# Patient Record
Sex: FEMALE | Race: WHITE | NOT HISPANIC OR LATINO | Employment: OTHER | RURAL
[De-identification: names, ages, dates, MRNs, and addresses within clinical notes are randomized per-mention and may not be internally consistent; named-entity substitution may affect disease eponyms.]

---

## 2021-06-24 DIAGNOSIS — I10 HYPERTENSION, UNSPECIFIED TYPE: Primary | ICD-10-CM

## 2021-07-01 ENCOUNTER — LAB VISIT (OUTPATIENT)
Dept: LAB | Facility: CLINIC | Age: 71
End: 2021-07-01
Payer: MEDICARE

## 2021-07-01 ENCOUNTER — OFFICE VISIT (OUTPATIENT)
Dept: PRIMARY CARE CLINIC | Facility: CLINIC | Age: 71
End: 2021-07-01
Payer: MEDICARE

## 2021-07-01 ENCOUNTER — TELEPHONE (OUTPATIENT)
Dept: PRIMARY CARE CLINIC | Facility: CLINIC | Age: 71
End: 2021-07-01

## 2021-07-01 VITALS
SYSTOLIC BLOOD PRESSURE: 124 MMHG | BODY MASS INDEX: 25.07 KG/M2 | WEIGHT: 156 LBS | HEART RATE: 66 BPM | DIASTOLIC BLOOD PRESSURE: 68 MMHG | OXYGEN SATURATION: 97 % | HEIGHT: 66 IN | RESPIRATION RATE: 18 BRPM

## 2021-07-01 DIAGNOSIS — I10 HYPERTENSION, UNSPECIFIED TYPE: ICD-10-CM

## 2021-07-01 DIAGNOSIS — F41.9 ANXIETY: Primary | ICD-10-CM

## 2021-07-01 DIAGNOSIS — L50.9 URTICARIA: ICD-10-CM

## 2021-07-01 DIAGNOSIS — J30.9 ALLERGIC RHINITIS, UNSPECIFIED SEASONALITY, UNSPECIFIED TRIGGER: ICD-10-CM

## 2021-07-01 LAB
ALBUMIN SERPL BCP-MCNC: 3.7 G/DL (ref 3.5–5)
ALBUMIN/GLOB SERPL: 1.1 {RATIO}
ALP SERPL-CCNC: 71 U/L (ref 55–142)
ALT SERPL W P-5'-P-CCNC: 17 U/L (ref 13–56)
ANION GAP SERPL CALCULATED.3IONS-SCNC: 8 MMOL/L (ref 7–16)
AST SERPL W P-5'-P-CCNC: 14 U/L (ref 15–37)
BASOPHILS # BLD AUTO: 0.15 K/UL (ref 0–0.2)
BASOPHILS NFR BLD AUTO: 1.8 % (ref 0–1)
BILIRUB SERPL-MCNC: 0.2 MG/DL (ref 0–1.2)
BUN SERPL-MCNC: 13 MG/DL (ref 7–18)
BUN/CREAT SERPL: 15 (ref 6–20)
CALCIUM SERPL-MCNC: 8.3 MG/DL (ref 8.5–10.1)
CHLORIDE SERPL-SCNC: 108 MMOL/L (ref 98–107)
CHOLEST SERPL-MCNC: 226 MG/DL (ref 0–200)
CHOLEST/HDLC SERPL: 6.1 {RATIO}
CO2 SERPL-SCNC: 27 MMOL/L (ref 21–32)
CREAT SERPL-MCNC: 0.89 MG/DL (ref 0.55–1.02)
DIFFERENTIAL METHOD BLD: ABNORMAL
EOSINOPHIL # BLD AUTO: 0.62 K/UL (ref 0–0.5)
EOSINOPHIL NFR BLD AUTO: 7.5 % (ref 1–4)
ERYTHROCYTE [DISTWIDTH] IN BLOOD BY AUTOMATED COUNT: 13 % (ref 11.5–14.5)
EST. AVERAGE GLUCOSE BLD GHB EST-MCNC: 87 MG/DL
GLOBULIN SER-MCNC: 3.4 G/DL (ref 2–4)
GLUCOSE SERPL-MCNC: 105 MG/DL (ref 74–106)
HBA1C MFR BLD HPLC: 5.2 % (ref 4.5–6.6)
HCT VFR BLD AUTO: 41.7 % (ref 38–47)
HDLC SERPL-MCNC: 37 MG/DL (ref 40–60)
HGB BLD-MCNC: 14 G/DL (ref 12–16)
IMM GRANULOCYTES # BLD AUTO: 0.02 K/UL (ref 0–0.04)
IMM GRANULOCYTES NFR BLD: 0.2 % (ref 0–0.4)
LDLC SERPL CALC-MCNC: 141 MG/DL
LDLC/HDLC SERPL: 3.8 {RATIO}
LYMPHOCYTES # BLD AUTO: 3.41 K/UL (ref 1–4.8)
LYMPHOCYTES NFR BLD AUTO: 41.5 % (ref 27–41)
MCH RBC QN AUTO: 30.7 PG (ref 27–31)
MCHC RBC AUTO-ENTMCNC: 33.6 G/DL (ref 32–36)
MCV RBC AUTO: 91.4 FL (ref 80–96)
MONOCYTES # BLD AUTO: 0.52 K/UL (ref 0–0.8)
MONOCYTES NFR BLD AUTO: 6.3 % (ref 2–6)
MPC BLD CALC-MCNC: 12 FL (ref 9.4–12.4)
NEUTROPHILS # BLD AUTO: 3.5 K/UL (ref 1.8–7.7)
NEUTROPHILS NFR BLD AUTO: 42.7 % (ref 53–65)
NONHDLC SERPL-MCNC: 189 MG/DL
NRBC # BLD AUTO: 0 X10E3/UL
NRBC, AUTO (.00): 0 %
PLATELET # BLD AUTO: 264 K/UL (ref 150–400)
POTASSIUM SERPL-SCNC: 3.9 MMOL/L (ref 3.5–5.1)
PROT SERPL-MCNC: 7.1 G/DL (ref 6.4–8.2)
RBC # BLD AUTO: 4.56 M/UL (ref 4.2–5.4)
SODIUM SERPL-SCNC: 139 MMOL/L (ref 136–145)
TRIGL SERPL-MCNC: 238 MG/DL (ref 35–150)
TSH SERPL DL<=0.005 MIU/L-ACNC: 5.46 UIU/ML (ref 0.36–3.74)
VLDLC SERPL-MCNC: 48 MG/DL
WBC # BLD AUTO: 8.22 K/UL (ref 4.5–11)

## 2021-07-01 PROCEDURE — 99214 OFFICE O/P EST MOD 30 MIN: CPT | Mod: ,,, | Performed by: FAMILY MEDICINE

## 2021-07-01 PROCEDURE — 36415 PR COLLECTION VENOUS BLOOD,VENIPUNCTURE: ICD-10-PCS | Mod: ,,, | Performed by: CLINICAL MEDICAL LABORATORY

## 2021-07-01 PROCEDURE — 83036 HEMOGLOBIN GLYCOSYLATED A1C: CPT | Mod: GZ,,, | Performed by: CLINICAL MEDICAL LABORATORY

## 2021-07-01 PROCEDURE — 36415 COLL VENOUS BLD VENIPUNCTURE: CPT | Mod: ,,, | Performed by: CLINICAL MEDICAL LABORATORY

## 2021-07-01 PROCEDURE — 80053 COMPREHENSIVE METABOLIC PANEL: ICD-10-PCS | Mod: ,,, | Performed by: CLINICAL MEDICAL LABORATORY

## 2021-07-01 PROCEDURE — 80061 LIPID PANEL: ICD-10-PCS | Mod: ,,, | Performed by: CLINICAL MEDICAL LABORATORY

## 2021-07-01 PROCEDURE — 80061 LIPID PANEL: CPT | Mod: ,,, | Performed by: CLINICAL MEDICAL LABORATORY

## 2021-07-01 PROCEDURE — 85025 CBC WITH DIFFERENTIAL: ICD-10-PCS | Mod: ,,, | Performed by: CLINICAL MEDICAL LABORATORY

## 2021-07-01 PROCEDURE — 83036 HEMOGLOBIN A1C: ICD-10-PCS | Mod: GZ,,, | Performed by: CLINICAL MEDICAL LABORATORY

## 2021-07-01 PROCEDURE — 85025 COMPLETE CBC W/AUTO DIFF WBC: CPT | Mod: ,,, | Performed by: CLINICAL MEDICAL LABORATORY

## 2021-07-01 PROCEDURE — 80053 COMPREHEN METABOLIC PANEL: CPT | Mod: ,,, | Performed by: CLINICAL MEDICAL LABORATORY

## 2021-07-01 PROCEDURE — 84443 TSH: ICD-10-PCS | Mod: ,,, | Performed by: CLINICAL MEDICAL LABORATORY

## 2021-07-01 PROCEDURE — 99214 PR OFFICE/OUTPT VISIT, EST, LEVL IV, 30-39 MIN: ICD-10-PCS | Mod: ,,, | Performed by: FAMILY MEDICINE

## 2021-07-01 PROCEDURE — 84443 ASSAY THYROID STIM HORMONE: CPT | Mod: ,,, | Performed by: CLINICAL MEDICAL LABORATORY

## 2021-07-01 RX ORDER — CHLORDIAZEPOXIDE HYDROCHLORIDE 5 MG/1
5 CAPSULE, GELATIN COATED ORAL 3 TIMES DAILY PRN
Qty: 90 CAPSULE | Refills: 5 | Status: SHIPPED | OUTPATIENT
Start: 2021-07-01 | End: 2022-06-21 | Stop reason: SDUPTHER

## 2021-07-01 RX ORDER — PRAVASTATIN SODIUM 10 MG/1
10 TABLET ORAL DAILY
Qty: 90 TABLET | Refills: 3 | Status: SHIPPED | OUTPATIENT
Start: 2021-07-01 | End: 2022-06-21 | Stop reason: SDUPTHER

## 2021-07-01 RX ORDER — MONTELUKAST SODIUM 10 MG/1
10 TABLET ORAL NIGHTLY
Qty: 90 TABLET | Refills: 3 | Status: SHIPPED | OUTPATIENT
Start: 2021-07-01 | End: 2022-09-06

## 2021-07-01 RX ORDER — LEVOTHYROXINE SODIUM 50 UG/1
50 TABLET ORAL
Qty: 90 TABLET | Refills: 3 | Status: SHIPPED | OUTPATIENT
Start: 2021-07-01 | End: 2022-06-21 | Stop reason: SDUPTHER

## 2021-07-01 RX ORDER — ASPIRIN 325 MG
325 TABLET ORAL DAILY
COMMUNITY

## 2021-09-14 DIAGNOSIS — I10 HYPERTENSION, UNSPECIFIED TYPE: Primary | ICD-10-CM

## 2021-09-28 ENCOUNTER — LAB VISIT (OUTPATIENT)
Dept: LAB | Facility: CLINIC | Age: 71
End: 2021-09-28
Payer: MEDICARE

## 2021-09-28 DIAGNOSIS — R73.9 HYPERGLYCEMIA: Primary | ICD-10-CM

## 2021-09-28 DIAGNOSIS — I10 HYPERTENSION, UNSPECIFIED TYPE: ICD-10-CM

## 2021-09-28 LAB
ALBUMIN SERPL BCP-MCNC: 3.8 G/DL (ref 3.5–5)
ALBUMIN/GLOB SERPL: 1 {RATIO}
ALP SERPL-CCNC: 80 U/L (ref 55–142)
ALT SERPL W P-5'-P-CCNC: 15 U/L (ref 13–56)
ANION GAP SERPL CALCULATED.3IONS-SCNC: 8 MMOL/L (ref 7–16)
AST SERPL W P-5'-P-CCNC: 17 U/L (ref 15–37)
BASOPHILS # BLD AUTO: 0.11 K/UL (ref 0–0.2)
BASOPHILS NFR BLD AUTO: 1.3 % (ref 0–1)
BILIRUB SERPL-MCNC: 0.3 MG/DL (ref 0–1.2)
BUN SERPL-MCNC: 13 MG/DL (ref 7–18)
BUN/CREAT SERPL: 14 (ref 6–20)
CALCIUM SERPL-MCNC: 9.7 MG/DL (ref 8.5–10.1)
CHLORIDE SERPL-SCNC: 111 MMOL/L (ref 98–107)
CHOLEST SERPL-MCNC: 193 MG/DL (ref 0–200)
CHOLEST/HDLC SERPL: 4.4 {RATIO}
CO2 SERPL-SCNC: 26 MMOL/L (ref 21–32)
CREAT SERPL-MCNC: 0.9 MG/DL (ref 0.55–1.02)
DIFFERENTIAL METHOD BLD: ABNORMAL
EOSINOPHIL # BLD AUTO: 0.62 K/UL (ref 0–0.5)
EOSINOPHIL NFR BLD AUTO: 7.6 % (ref 1–4)
ERYTHROCYTE [DISTWIDTH] IN BLOOD BY AUTOMATED COUNT: 12.8 % (ref 11.5–14.5)
EST. AVERAGE GLUCOSE BLD GHB EST-MCNC: 90 MG/DL
GLOBULIN SER-MCNC: 3.8 G/DL (ref 2–4)
GLUCOSE SERPL-MCNC: 144 MG/DL (ref 74–106)
HBA1C MFR BLD HPLC: 5.3 % (ref 4.5–6.6)
HCT VFR BLD AUTO: 43.4 % (ref 38–47)
HDLC SERPL-MCNC: 44 MG/DL (ref 40–60)
HGB BLD-MCNC: 14.4 G/DL (ref 12–16)
IMM GRANULOCYTES # BLD AUTO: 0.02 K/UL (ref 0–0.04)
IMM GRANULOCYTES NFR BLD: 0.2 % (ref 0–0.4)
LDLC SERPL CALC-MCNC: 116 MG/DL
LDLC/HDLC SERPL: 2.6 {RATIO}
LYMPHOCYTES # BLD AUTO: 2.3 K/UL (ref 1–4.8)
LYMPHOCYTES NFR BLD AUTO: 28.1 % (ref 27–41)
MCH RBC QN AUTO: 30.9 PG (ref 27–31)
MCHC RBC AUTO-ENTMCNC: 33.2 G/DL (ref 32–36)
MCV RBC AUTO: 93.1 FL (ref 80–96)
MONOCYTES # BLD AUTO: 0.45 K/UL (ref 0–0.8)
MONOCYTES NFR BLD AUTO: 5.5 % (ref 2–6)
MPC BLD CALC-MCNC: 12.5 FL (ref 9.4–12.4)
NEUTROPHILS # BLD AUTO: 4.68 K/UL (ref 1.8–7.7)
NEUTROPHILS NFR BLD AUTO: 57.3 % (ref 53–65)
NONHDLC SERPL-MCNC: 149 MG/DL
NRBC # BLD AUTO: 0 X10E3/UL
NRBC, AUTO (.00): 0 %
PLATELET # BLD AUTO: 260 K/UL (ref 150–400)
POTASSIUM SERPL-SCNC: 3.8 MMOL/L (ref 3.5–5.1)
PROT SERPL-MCNC: 7.6 G/DL (ref 6.4–8.2)
RBC # BLD AUTO: 4.66 M/UL (ref 4.2–5.4)
SODIUM SERPL-SCNC: 141 MMOL/L (ref 136–145)
TRIGL SERPL-MCNC: 166 MG/DL (ref 35–150)
TSH SERPL DL<=0.005 MIU/L-ACNC: 1.81 UIU/ML (ref 0.36–3.74)
VLDLC SERPL-MCNC: 33 MG/DL
WBC # BLD AUTO: 8.18 K/UL (ref 4.5–11)

## 2021-09-28 PROCEDURE — 80053 COMPREHENSIVE METABOLIC PANEL: ICD-10-PCS | Mod: ,,, | Performed by: CLINICAL MEDICAL LABORATORY

## 2021-09-28 PROCEDURE — 84443 TSH: ICD-10-PCS | Mod: ,,, | Performed by: CLINICAL MEDICAL LABORATORY

## 2021-09-28 PROCEDURE — 84443 ASSAY THYROID STIM HORMONE: CPT | Mod: ,,, | Performed by: CLINICAL MEDICAL LABORATORY

## 2021-09-28 PROCEDURE — 80053 COMPREHEN METABOLIC PANEL: CPT | Mod: ,,, | Performed by: CLINICAL MEDICAL LABORATORY

## 2021-09-28 PROCEDURE — 85025 CBC WITH DIFFERENTIAL: ICD-10-PCS | Mod: ,,, | Performed by: CLINICAL MEDICAL LABORATORY

## 2021-09-28 PROCEDURE — 85025 COMPLETE CBC W/AUTO DIFF WBC: CPT | Mod: ,,, | Performed by: CLINICAL MEDICAL LABORATORY

## 2021-09-28 PROCEDURE — 80061 LIPID PANEL: CPT | Mod: ,,, | Performed by: CLINICAL MEDICAL LABORATORY

## 2021-09-28 PROCEDURE — 80061 LIPID PANEL: ICD-10-PCS | Mod: ,,, | Performed by: CLINICAL MEDICAL LABORATORY

## 2021-09-28 PROCEDURE — 36415 COLL VENOUS BLD VENIPUNCTURE: CPT | Mod: ,,, | Performed by: CLINICAL MEDICAL LABORATORY

## 2021-09-28 PROCEDURE — 36415 PR COLLECTION VENOUS BLOOD,VENIPUNCTURE: ICD-10-PCS | Mod: ,,, | Performed by: CLINICAL MEDICAL LABORATORY

## 2021-09-28 PROCEDURE — 83036 HEMOGLOBIN GLYCOSYLATED A1C: CPT | Mod: ,,, | Performed by: CLINICAL MEDICAL LABORATORY

## 2021-09-28 PROCEDURE — 83036 HEMOGLOBIN A1C: ICD-10-PCS | Mod: ,,, | Performed by: CLINICAL MEDICAL LABORATORY

## 2022-04-19 ENCOUNTER — PATIENT MESSAGE (OUTPATIENT)
Dept: PRIMARY CARE CLINIC | Facility: CLINIC | Age: 72
End: 2022-04-19
Payer: COMMERCIAL

## 2022-04-20 RX ORDER — AMOXICILLIN 500 MG/1
500 TABLET, FILM COATED ORAL EVERY 12 HOURS
Qty: 20 TABLET | Refills: 0 | Status: SHIPPED | OUTPATIENT
Start: 2022-04-20 | End: 2022-04-30

## 2022-06-21 ENCOUNTER — OFFICE VISIT (OUTPATIENT)
Dept: PRIMARY CARE CLINIC | Facility: CLINIC | Age: 72
End: 2022-06-21
Payer: MEDICARE

## 2022-06-21 VITALS
WEIGHT: 151 LBS | DIASTOLIC BLOOD PRESSURE: 72 MMHG | OXYGEN SATURATION: 97 % | HEART RATE: 75 BPM | RESPIRATION RATE: 18 BRPM | BODY MASS INDEX: 24.27 KG/M2 | HEIGHT: 66 IN | SYSTOLIC BLOOD PRESSURE: 130 MMHG

## 2022-06-21 DIAGNOSIS — L50.9 URTICARIA: ICD-10-CM

## 2022-06-21 DIAGNOSIS — E03.9 HYPOTHYROIDISM, UNSPECIFIED TYPE: ICD-10-CM

## 2022-06-21 DIAGNOSIS — F41.9 ANXIETY: Primary | ICD-10-CM

## 2022-06-21 DIAGNOSIS — R73.9 HYPERGLYCEMIA: Primary | ICD-10-CM

## 2022-06-21 DIAGNOSIS — J30.9 ALLERGIC RHINITIS, UNSPECIFIED SEASONALITY, UNSPECIFIED TRIGGER: ICD-10-CM

## 2022-06-21 DIAGNOSIS — E78.5 HYPERLIPIDEMIA, UNSPECIFIED HYPERLIPIDEMIA TYPE: ICD-10-CM

## 2022-06-21 PROCEDURE — 99214 PR OFFICE/OUTPT VISIT, EST, LEVL IV, 30-39 MIN: ICD-10-PCS | Mod: ,,, | Performed by: FAMILY MEDICINE

## 2022-06-21 PROCEDURE — 99214 OFFICE O/P EST MOD 30 MIN: CPT | Mod: ,,, | Performed by: FAMILY MEDICINE

## 2022-06-21 RX ORDER — PRAVASTATIN SODIUM 10 MG/1
10 TABLET ORAL DAILY
Qty: 90 TABLET | Refills: 3 | Status: SHIPPED | OUTPATIENT
Start: 2022-06-21 | End: 2023-06-21 | Stop reason: SDUPTHER

## 2022-06-21 RX ORDER — LEVOTHYROXINE SODIUM 50 UG/1
50 TABLET ORAL
Qty: 90 TABLET | Refills: 3 | Status: SHIPPED | OUTPATIENT
Start: 2022-06-21 | End: 2023-06-21 | Stop reason: SDUPTHER

## 2022-06-21 RX ORDER — CHLORDIAZEPOXIDE HYDROCHLORIDE 5 MG/1
5 CAPSULE, GELATIN COATED ORAL 3 TIMES DAILY PRN
Qty: 90 CAPSULE | Refills: 5 | Status: SHIPPED | OUTPATIENT
Start: 2022-06-21 | End: 2023-12-21

## 2022-06-21 RX ORDER — MONTELUKAST SODIUM 10 MG/1
1 TABLET ORAL DAILY
COMMUNITY
Start: 2022-06-10 | End: 2022-06-21

## 2022-06-21 NOTE — PROGRESS NOTES
Subjective:      Patient ID: Krupa Jamse is a 72 y.o. female.    Chief Complaint: Follow-up (Needs refills)    Krupa James a 72 y.o. female presents for follow up on all regular problems which are reviewed and discussed.   Fu reg probs stable needs lab  Problem List Items Addressed This Visit        Psychiatric    Anxiety - Primary       ENT    Allergic rhinitis       Derm    Urticaria          Past Medical History:  Past Medical History:   Diagnosis Date    Anxiety     Arthritis     Stroke      Past Surgical History:   Procedure Laterality Date    CHOLECYSTECTOMY       Review of patient's allergies indicates:   Allergen Reactions    Iodine and iodide containing products     Latex, natural rubber      Current Outpatient Medications on File Prior to Visit   Medication Sig Dispense Refill    aspirin 325 MG tablet Take 325 mg by mouth once daily.      [DISCONTINUED] chlordiazepoxide (LIBRIUM) 5 MG capsule Take 1 capsule (5 mg total) by mouth 3 (three) times daily as needed for Anxiety. 90 capsule 5    [DISCONTINUED] levothyroxine (SYNTHROID) 50 MCG tablet Take 1 tablet (50 mcg total) by mouth before breakfast. 90 tablet 3    [DISCONTINUED] pravastatin (PRAVACHOL) 10 MG tablet Take 1 tablet (10 mg total) by mouth once daily. 90 tablet 3    [DISCONTINUED] montelukast (SINGULAIR) 10 mg tablet Take 1 tablet by mouth Daily.       No current facility-administered medications on file prior to visit.     Social History     Socioeconomic History    Marital status: Legally    Tobacco Use    Smoking status: Current Every Day Smoker     Types: Cigarettes    Smokeless tobacco: Never Used   Substance and Sexual Activity    Alcohol use: Never    Drug use: Never     Family History   Problem Relation Age of Onset    Alzheimer's disease Mother     Diabetes Mother     Stroke Mother     Meniere's disease Mother     Leukemia Father     Parkinsonism Brother        Review of Systems   Constitutional:  "Negative.  Negative for activity change, appetite change, chills and diaphoresis.   HENT: Negative.  Negative for congestion, ear pain, hearing loss and postnasal drip.    Eyes: Negative for itching.   Respiratory: Negative for chest tightness and shortness of breath.    Cardiovascular: Negative for chest pain.   Gastrointestinal: Negative for abdominal pain.   Endocrine: Negative for polydipsia.   Genitourinary: Negative for frequency.   Musculoskeletal: Negative for back pain.   Neurological: Negative for headaches.       Objective:     /72 (BP Location: Right arm, Patient Position: Sitting, BP Method: Large (Manual))   Pulse 75   Resp 18   Ht 5' 6" (1.676 m)   Wt 68.5 kg (151 lb)   SpO2 97%   BMI 24.37 kg/m²     Physical Exam  Constitutional:       Appearance: Normal appearance. She is obese.   HENT:      Head: Normocephalic and atraumatic.      Right Ear: External ear normal.      Left Ear: External ear normal.      Nose: Nose normal.      Mouth/Throat:      Mouth: Mucous membranes are moist.      Pharynx: Oropharynx is clear.   Eyes:      Pupils: Pupils are equal, round, and reactive to light.   Cardiovascular:      Rate and Rhythm: Normal rate and regular rhythm.      Heart sounds: Normal heart sounds.   Pulmonary:      Effort: Pulmonary effort is normal.      Breath sounds: Normal breath sounds.   Abdominal:      Palpations: Abdomen is soft.   Musculoskeletal:      Cervical back: Normal range of motion and neck supple.   Skin:     General: Skin is warm and dry.   Neurological:      General: No focal deficit present.      Mental Status: She is alert and oriented to person, place, and time. Mental status is at baseline.   Psychiatric:         Mood and Affect: Mood normal.         Behavior: Behavior normal.         Thought Content: Thought content normal.         Judgment: Judgment normal.       Assessment:     1. Anxiety    2. Allergic rhinitis, unspecified seasonality, unspecified trigger    3. " Urticaria        Plan:     Problem List Items Addressed This Visit        Psychiatric    Anxiety - Primary       ENT    Allergic rhinitis       Derm    Urticaria        No follow-ups on file.  Ok to have alex leave off last librium if does so  Lab today  6m fu    I am having Krupa James maintain her aspirin, levothyroxine, pravastatin, and chlordiazepoxide.    Krupa was seen today for follow-up.    Diagnoses and all orders for this visit:    Anxiety    Allergic rhinitis, unspecified seasonality, unspecified trigger    Urticaria    Other orders  -     levothyroxine (SYNTHROID) 50 MCG tablet; Take 1 tablet (50 mcg total) by mouth before breakfast.  -     pravastatin (PRAVACHOL) 10 MG tablet; Take 1 tablet (10 mg total) by mouth once daily.  -     chlordiazepoxide (LIBRIUM) 5 MG capsule; Take 1 capsule (5 mg total) by mouth 3 (three) times daily as needed for Anxiety.      Medications Ordered This Encounter   Medications    chlordiazepoxide (LIBRIUM) 5 MG capsule     Sig: Take 1 capsule (5 mg total) by mouth 3 (three) times daily as needed for Anxiety.     Dispense:  90 capsule     Refill:  5    levothyroxine (SYNTHROID) 50 MCG tablet     Sig: Take 1 tablet (50 mcg total) by mouth before breakfast.     Dispense:  90 tablet     Refill:  3    pravastatin (PRAVACHOL) 10 MG tablet     Sig: Take 1 tablet (10 mg total) by mouth once daily.     Dispense:  90 tablet     Refill:  3     [unfilled]  No orders of the defined types were placed in this encounter.

## 2022-06-22 ENCOUNTER — TELEPHONE (OUTPATIENT)
Dept: PRIMARY CARE CLINIC | Facility: CLINIC | Age: 72
End: 2022-06-22
Payer: COMMERCIAL

## 2022-06-22 NOTE — TELEPHONE ENCOUNTER
----- Message from Chase Goldman III, DO sent at 6/22/2022  8:10 AM CDT -----  Labs good let know stay on same thyroid dose

## 2022-09-02 ENCOUNTER — PATIENT MESSAGE (OUTPATIENT)
Dept: PRIMARY CARE CLINIC | Facility: CLINIC | Age: 72
End: 2022-09-02
Payer: COMMERCIAL

## 2022-09-06 RX ORDER — MONTELUKAST SODIUM 10 MG/1
TABLET ORAL
Qty: 90 TABLET | Refills: 0 | Status: SHIPPED | OUTPATIENT
Start: 2022-09-06 | End: 2022-12-05

## 2022-09-06 RX ORDER — MONTELUKAST SODIUM 10 MG/1
10 TABLET ORAL DAILY
COMMUNITY
End: 2022-12-21 | Stop reason: SDUPTHER

## 2022-11-07 RX ORDER — MONTELUKAST SODIUM 10 MG/1
10 TABLET ORAL DAILY
Qty: 90 TABLET | Refills: 3 | Status: CANCELLED | OUTPATIENT
Start: 2022-11-07

## 2022-12-21 ENCOUNTER — OFFICE VISIT (OUTPATIENT)
Dept: PRIMARY CARE CLINIC | Facility: CLINIC | Age: 72
End: 2022-12-21
Payer: MEDICARE

## 2022-12-21 VITALS
RESPIRATION RATE: 18 BRPM | HEART RATE: 102 BPM | WEIGHT: 157 LBS | DIASTOLIC BLOOD PRESSURE: 82 MMHG | HEIGHT: 66 IN | SYSTOLIC BLOOD PRESSURE: 132 MMHG | OXYGEN SATURATION: 99 % | BODY MASS INDEX: 25.23 KG/M2

## 2022-12-21 DIAGNOSIS — L50.9 URTICARIA: ICD-10-CM

## 2022-12-21 DIAGNOSIS — F41.9 ANXIETY: Primary | ICD-10-CM

## 2022-12-21 DIAGNOSIS — J30.9 ALLERGIC RHINITIS, UNSPECIFIED SEASONALITY, UNSPECIFIED TRIGGER: ICD-10-CM

## 2022-12-21 PROCEDURE — 99214 PR OFFICE/OUTPT VISIT, EST, LEVL IV, 30-39 MIN: ICD-10-PCS | Mod: ,,, | Performed by: FAMILY MEDICINE

## 2022-12-21 PROCEDURE — 99214 OFFICE O/P EST MOD 30 MIN: CPT | Mod: ,,, | Performed by: FAMILY MEDICINE

## 2022-12-21 RX ORDER — CETIRIZINE HYDROCHLORIDE, PSEUDOEPHEDRINE HYDROCHLORIDE 5; 120 MG/1; MG/1
1 TABLET, FILM COATED, EXTENDED RELEASE ORAL 2 TIMES DAILY PRN
Qty: 24 TABLET | Refills: 5 | Status: SHIPPED | OUTPATIENT
Start: 2022-12-21 | End: 2022-12-31

## 2022-12-21 NOTE — PROGRESS NOTES
Subjective:      Patient ID: Krupa James is a 72 y.o. female.    Chief Complaint: Follow-up (6mon. Ck-up)    Krupa James a 72 y.o. female presents for follow up on all regular problems which are reviewed and discussed.   Sinus allergy  Problem List Items Addressed This Visit          Psychiatric    Anxiety - Primary       ENT    Allergic rhinitis       Derm    Urticaria       Past Medical History:  Past Medical History:   Diagnosis Date    Anxiety     Arthritis     Stroke      Past Surgical History:   Procedure Laterality Date    CHOLECYSTECTOMY       Review of patient's allergies indicates:   Allergen Reactions    Iodine and iodide containing products     Latex, natural rubber      Current Outpatient Medications on File Prior to Visit   Medication Sig Dispense Refill    aspirin 325 MG tablet Take 325 mg by mouth once daily.      chlordiazepoxide (LIBRIUM) 5 MG capsule Take 1 capsule (5 mg total) by mouth 3 (three) times daily as needed for Anxiety. 90 capsule 5    levothyroxine (SYNTHROID) 50 MCG tablet Take 1 tablet (50 mcg total) by mouth before breakfast. 90 tablet 3    montelukast (SINGULAIR) 10 mg tablet TAKE 1 TABLET BY MOUTH ONCE DAILY IN THE EVENING 90 tablet 0    pravastatin (PRAVACHOL) 10 MG tablet Take 1 tablet (10 mg total) by mouth once daily. 90 tablet 3    [DISCONTINUED] montelukast (SINGULAIR) 10 mg tablet Take 10 mg by mouth Daily.       No current facility-administered medications on file prior to visit.     Social History     Socioeconomic History    Marital status: Legally    Tobacco Use    Smoking status: Every Day     Packs/day: 1.00     Years: 15.00     Pack years: 15.00     Types: Cigarettes    Smokeless tobacco: Never   Substance and Sexual Activity    Alcohol use: Never    Drug use: Never    Sexual activity: Never     Family History   Problem Relation Age of Onset    Alzheimer's disease Mother     Diabetes Mother     Stroke Mother     Meniere's disease Mother      "Leukemia Father     Cancer Father     Parkinsonism Brother        Review of Systems   Constitutional: Negative.  Negative for activity change, appetite change, chills, diaphoresis and unexpected weight change.   HENT: Negative.  Negative for congestion, ear pain, hearing loss, postnasal drip, rhinorrhea and trouble swallowing.    Eyes:  Negative for discharge, itching and visual disturbance.   Respiratory:  Negative for chest tightness, shortness of breath and wheezing.    Cardiovascular:  Negative for chest pain and palpitations.   Gastrointestinal:  Negative for abdominal pain, blood in stool, constipation, diarrhea and vomiting.   Endocrine: Negative for polydipsia and polyuria.   Genitourinary:  Negative for difficulty urinating, dysuria, frequency, hematuria and menstrual problem.   Musculoskeletal:  Negative for arthralgias, back pain, joint swelling and neck pain.   Neurological:  Negative for weakness and headaches.   Psychiatric/Behavioral:  Negative for confusion and dysphoric mood.      Objective:     /82 (BP Location: Right arm, Patient Position: Sitting, BP Method: Large (Manual))   Pulse 102   Resp 18   Ht 5' 6" (1.676 m)   Wt 71.2 kg (157 lb)   SpO2 99%   BMI 25.34 kg/m²     Physical Exam  Constitutional:       Appearance: Normal appearance. She is obese. She is not ill-appearing or diaphoretic.   HENT:      Head: Normocephalic and atraumatic.      Right Ear: External ear normal.      Left Ear: External ear normal.      Nose: Nose normal.      Mouth/Throat:      Mouth: Mucous membranes are moist.      Pharynx: Oropharynx is clear.   Eyes:      Pupils: Pupils are equal, round, and reactive to light.   Neck:      Vascular: No carotid bruit.   Cardiovascular:      Rate and Rhythm: Normal rate and regular rhythm.      Heart sounds: Normal heart sounds.   Pulmonary:      Effort: Pulmonary effort is normal.      Breath sounds: Normal breath sounds.   Abdominal:      Palpations: Abdomen is soft. "   Musculoskeletal:      Cervical back: Normal range of motion and neck supple. No tenderness.   Skin:     General: Skin is warm and dry.   Neurological:      General: No focal deficit present.      Mental Status: She is alert and oriented to person, place, and time. Mental status is at baseline.   Psychiatric:         Mood and Affect: Mood normal.         Behavior: Behavior normal.         Thought Content: Thought content normal.         Judgment: Judgment normal.   Assessment:     1. Anxiety    2. Allergic rhinitis, unspecified seasonality, unspecified trigger    3. Urticaria        Plan:     Problem List Items Addressed This Visit          Psychiatric    Anxiety - Primary       ENT    Allergic rhinitis       Derm    Urticaria     No follow-ups on file.  6m fu labs discussed screening ct lungs    I am having Krupa James start on cetirizine-pseudoephedrine. I am also having her maintain her aspirin, levothyroxine, pravastatin, chlordiazepoxide, and montelukast.    Krupa was seen today for follow-up.    Diagnoses and all orders for this visit:    Anxiety    Allergic rhinitis, unspecified seasonality, unspecified trigger    Urticaria    Other orders  -     cetirizine-pseudoephedrine 5-120 mg Tb12; Take 1 tablet by mouth 2 (two) times daily as needed.      Medications Ordered This Encounter   Medications    cetirizine-pseudoephedrine 5-120 mg Tb12     Sig: Take 1 tablet by mouth 2 (two) times daily as needed.     Dispense:  24 tablet     Refill:  5     [unfilled]  No orders of the defined types were placed in this encounter.

## 2023-06-21 ENCOUNTER — OFFICE VISIT (OUTPATIENT)
Dept: PRIMARY CARE CLINIC | Facility: CLINIC | Age: 73
End: 2023-06-21
Payer: MEDICARE

## 2023-06-21 ENCOUNTER — TELEPHONE (OUTPATIENT)
Dept: PRIMARY CARE CLINIC | Facility: CLINIC | Age: 73
End: 2023-06-21
Payer: MEDICARE

## 2023-06-21 VITALS
HEART RATE: 80 BPM | BODY MASS INDEX: 26.03 KG/M2 | WEIGHT: 162 LBS | DIASTOLIC BLOOD PRESSURE: 86 MMHG | HEIGHT: 66 IN | SYSTOLIC BLOOD PRESSURE: 126 MMHG | RESPIRATION RATE: 18 BRPM | OXYGEN SATURATION: 99 %

## 2023-06-21 DIAGNOSIS — F41.9 ANXIETY: Primary | ICD-10-CM

## 2023-06-21 DIAGNOSIS — Z72.0 TOBACCO ABUSE: ICD-10-CM

## 2023-06-21 DIAGNOSIS — J30.9 ALLERGIC RHINITIS, UNSPECIFIED SEASONALITY, UNSPECIFIED TRIGGER: ICD-10-CM

## 2023-06-21 DIAGNOSIS — M19.90 ARTHRITIS: ICD-10-CM

## 2023-06-21 PROCEDURE — 99214 OFFICE O/P EST MOD 30 MIN: CPT | Mod: ,,, | Performed by: FAMILY MEDICINE

## 2023-06-21 PROCEDURE — 99214 PR OFFICE/OUTPT VISIT, EST, LEVL IV, 30-39 MIN: ICD-10-PCS | Mod: ,,, | Performed by: FAMILY MEDICINE

## 2023-06-21 RX ORDER — PRAVASTATIN SODIUM 10 MG/1
10 TABLET ORAL DAILY
Qty: 90 TABLET | Refills: 3 | Status: SHIPPED | OUTPATIENT
Start: 2023-06-21 | End: 2023-06-21

## 2023-06-21 RX ORDER — PRAVASTATIN SODIUM 40 MG/1
40 TABLET ORAL DAILY
Qty: 90 TABLET | Refills: 3 | Status: SHIPPED | OUTPATIENT
Start: 2023-06-21 | End: 2024-06-20

## 2023-06-21 RX ORDER — CETIRIZINE HYDROCHLORIDE AND PSEUDOEPHEDRINE HYDROCHLORIDE 5; 120 MG/1; MG/1
1 TABLET, FILM COATED, EXTENDED RELEASE ORAL 2 TIMES DAILY PRN
COMMUNITY
Start: 2023-01-03 | End: 2024-01-02 | Stop reason: SDUPTHER

## 2023-06-21 RX ORDER — LEVOTHYROXINE SODIUM 50 UG/1
50 TABLET ORAL
Qty: 90 TABLET | Refills: 3 | Status: SHIPPED | OUTPATIENT
Start: 2023-06-21 | End: 2024-06-20

## 2023-06-21 NOTE — PROGRESS NOTES
Subjective:      Patient ID: Krupa James is a 73 y.o. female.    Chief Complaint: Follow-up (6mon. Ck-up), Hyperlipidemia, and Hypothyroidism    Krupa James a 73 y.o. female presents for follow up on all regular problems which are reviewed and discussed.   Alz in family. discussed  Problem List Items Addressed This Visit          Psychiatric    Anxiety - Primary       ENT    Allergic rhinitis       Orthopedic    Arthritis       Other    Tobacco abuse       Past Medical History:  Past Medical History:   Diagnosis Date    Anxiety     Arthritis     Stroke      Past Surgical History:   Procedure Laterality Date    CHOLECYSTECTOMY       Review of patient's allergies indicates:   Allergen Reactions    Iodine and iodide containing products     Latex, natural rubber      Current Outpatient Medications on File Prior to Visit   Medication Sig Dispense Refill    aspirin 325 MG tablet Take 325 mg by mouth once daily.      chlordiazepoxide (LIBRIUM) 5 MG capsule Take 1 capsule (5 mg total) by mouth 3 (three) times daily as needed for Anxiety. 90 capsule 5    levothyroxine (SYNTHROID) 50 MCG tablet Take 1 tablet (50 mcg total) by mouth before breakfast. 90 tablet 3    montelukast (SINGULAIR) 10 mg tablet TAKE 1 TABLET BY MOUTH ONCE DAILY IN THE EVENING 90 tablet 0    pravastatin (PRAVACHOL) 10 MG tablet Take 1 tablet (10 mg total) by mouth once daily. 90 tablet 3    ZYRTEC-D 5-120 mg Tb12 Take 1 tablet by mouth 2 (two) times daily as needed.       No current facility-administered medications on file prior to visit.     Social History     Socioeconomic History    Marital status: Legally    Tobacco Use    Smoking status: Every Day     Packs/day: 1.00     Years: 15.00     Pack years: 15.00     Types: Cigarettes    Smokeless tobacco: Never   Substance and Sexual Activity    Alcohol use: Never    Drug use: Never    Sexual activity: Never     Family History   Problem Relation Age of Onset    Alzheimer's disease  "Mother     Diabetes Mother     Stroke Mother     Meniere's disease Mother     Leukemia Father     Cancer Father     Parkinsonism Brother        Review of Systems   Constitutional: Negative.  Negative for activity change, appetite change, chills and diaphoresis.   HENT: Negative.  Negative for congestion, ear pain, hearing loss and postnasal drip.    Eyes:  Negative for itching.   Respiratory:  Negative for chest tightness and shortness of breath.    Cardiovascular:  Negative for chest pain.   Gastrointestinal:  Negative for abdominal pain.   Endocrine: Negative for polydipsia.   Genitourinary:  Negative for frequency.   Musculoskeletal:  Negative for back pain.   Neurological:  Negative for headaches.     Objective:     /86 (BP Location: Right arm, Patient Position: Sitting, BP Method: Large (Manual))   Pulse 80   Resp 18   Ht 5' 6" (1.676 m)   Wt 73.5 kg (162 lb)   SpO2 99%   BMI 26.15 kg/m²     Physical Exam  Constitutional:       Appearance: Normal appearance. She is obese.   HENT:      Head: Normocephalic and atraumatic.      Right Ear: External ear normal.      Left Ear: External ear normal.      Nose: Nose normal.      Mouth/Throat:      Mouth: Mucous membranes are moist.      Pharynx: Oropharynx is clear.   Eyes:      Pupils: Pupils are equal, round, and reactive to light.   Cardiovascular:      Rate and Rhythm: Normal rate and regular rhythm.      Heart sounds: Normal heart sounds.   Pulmonary:      Effort: Pulmonary effort is normal.      Breath sounds: Normal breath sounds.   Abdominal:      Palpations: Abdomen is soft.   Musculoskeletal:      Cervical back: Normal range of motion and neck supple.   Skin:     General: Skin is warm and dry.   Neurological:      General: No focal deficit present.      Mental Status: She is alert and oriented to person, place, and time. Mental status is at baseline.   Psychiatric:         Mood and Affect: Mood normal.         Behavior: Behavior normal.         " Thought Content: Thought content normal.         Judgment: Judgment normal.       1. Anxiety    2. Allergic rhinitis, unspecified seasonality, unspecified trigger    3. Arthritis    4. Tobacco abuse        Plan:     Problem List Items Addressed This Visit          Psychiatric    Anxiety - Primary       ENT    Allergic rhinitis       Orthopedic    Arthritis       Other    Tobacco abuse     No follow-ups on file.  6m fu  Lab owo  Antiox./exercise/mind exercise    I am having Krupa James maintain her aspirin, levothyroxine, pravastatin, chlordiazepoxide, montelukast, and ZyrTEC-D.    Krupa was seen today for follow-up, hyperlipidemia and hypothyroidism.    Diagnoses and all orders for this visit:    Anxiety    Allergic rhinitis, unspecified seasonality, unspecified trigger    Arthritis    Tobacco abuse    Other orders  The following orders have not been finalized:  -     pravastatin (PRAVACHOL) 10 MG tablet  -     levothyroxine (SYNTHROID) 50 MCG tablet         [unfilled]  No orders of the defined types were placed in this encounter.

## 2023-06-21 NOTE — TELEPHONE ENCOUNTER
----- Message from Chase Goldman III DO sent at 6/21/2023  3:41 PM CDT -----  Chol. Is higher than two years ago.  Will increase strength of chol. Rx.  Other labs ok

## 2023-07-09 DIAGNOSIS — Z71.89 COMPLEX CARE COORDINATION: ICD-10-CM

## 2023-09-20 ENCOUNTER — PATIENT MESSAGE (OUTPATIENT)
Dept: ADMINISTRATIVE | Facility: HOSPITAL | Age: 73
End: 2023-09-20

## 2023-12-21 ENCOUNTER — OFFICE VISIT (OUTPATIENT)
Dept: PRIMARY CARE CLINIC | Facility: CLINIC | Age: 73
End: 2023-12-21
Payer: MEDICARE

## 2023-12-21 VITALS
SYSTOLIC BLOOD PRESSURE: 122 MMHG | RESPIRATION RATE: 18 BRPM | HEIGHT: 66 IN | WEIGHT: 160 LBS | HEART RATE: 77 BPM | OXYGEN SATURATION: 99 % | DIASTOLIC BLOOD PRESSURE: 74 MMHG | BODY MASS INDEX: 25.71 KG/M2

## 2023-12-21 DIAGNOSIS — J30.9 ALLERGIC RHINITIS, UNSPECIFIED SEASONALITY, UNSPECIFIED TRIGGER: Primary | ICD-10-CM

## 2023-12-21 DIAGNOSIS — K76.1 CHRONIC PASSIVE CONGESTION OF LIVER: ICD-10-CM

## 2023-12-21 DIAGNOSIS — M19.90 ARTHRITIS: ICD-10-CM

## 2023-12-21 DIAGNOSIS — M62.9 DISORDER OF MUSCLE, UNSPECIFIED: ICD-10-CM

## 2023-12-21 DIAGNOSIS — L50.9 URTICARIA: ICD-10-CM

## 2023-12-21 DIAGNOSIS — E83.10 DISORDER OF IRON METABOLISM, UNSPECIFIED: ICD-10-CM

## 2023-12-21 DIAGNOSIS — F41.9 ANXIETY: ICD-10-CM

## 2023-12-21 DIAGNOSIS — Z72.0 TOBACCO ABUSE: ICD-10-CM

## 2023-12-21 PROCEDURE — 99214 PR OFFICE/OUTPT VISIT, EST, LEVL IV, 30-39 MIN: ICD-10-PCS | Mod: ,,, | Performed by: FAMILY MEDICINE

## 2023-12-21 PROCEDURE — 99214 OFFICE O/P EST MOD 30 MIN: CPT | Mod: ,,, | Performed by: FAMILY MEDICINE

## 2023-12-21 NOTE — PROGRESS NOTES
Subjective:      Patient ID: Krupa James is a 73 y.o. female.    Chief Complaint: Follow-up (6mon. Ck-up)    Krupa James a 73 y.o. female presents for follow up on all regular problems which are reviewed and discussed.   Daughter with borderline personality  Brother parkinson  Mother alzh  Pt still smoking  Problem List Items Addressed This Visit          Psychiatric    Anxiety       ENT    Allergic rhinitis - Primary       Derm    Urticaria    Relevant Orders    Hemoglobin A1C    CBC Auto Differential    Comprehensive Metabolic Panel    Lipid Panel    TSH    Urinalysis       Orthopedic    Arthritis       Other    Tobacco abuse     Other Visit Diagnoses       Disorder of iron metabolism, unspecified        Relevant Orders    Hemoglobin A1C    Chronic passive congestion of liver        Relevant Orders    Lipid Panel    Disorder of muscle, unspecified        Relevant Orders    TSH            Past Medical History:  Past Medical History:   Diagnosis Date    Anxiety     Arthritis     Stroke      Past Surgical History:   Procedure Laterality Date    CHOLECYSTECTOMY       Review of patient's allergies indicates:   Allergen Reactions    Iodine and iodide containing products     Latex, natural rubber      Current Outpatient Medications on File Prior to Visit   Medication Sig Dispense Refill    aspirin 325 MG tablet Take 325 mg by mouth once daily.      chlordiazepoxide (LIBRIUM) 5 MG capsule Take 1 capsule (5 mg total) by mouth 3 (three) times daily as needed for Anxiety. 90 capsule 5    levothyroxine (SYNTHROID) 50 MCG tablet Take 1 tablet (50 mcg total) by mouth before breakfast. 90 tablet 3    pravastatin (PRAVACHOL) 40 MG tablet Take 1 tablet (40 mg total) by mouth once daily. 90 tablet 3    ZYRTEC-D 5-120 mg Tb12 Take 1 tablet by mouth 2 (two) times daily as needed.      [DISCONTINUED] montelukast (SINGULAIR) 10 mg tablet TAKE 1 TABLET BY MOUTH ONCE DAILY IN THE EVENING 90 tablet 0     No current  "facility-administered medications on file prior to visit.     Social History     Socioeconomic History    Marital status: Legally    Tobacco Use    Smoking status: Every Day     Current packs/day: 1.00     Average packs/day: 1 pack/day for 15.0 years (15.0 ttl pk-yrs)     Types: Cigarettes    Smokeless tobacco: Never   Substance and Sexual Activity    Alcohol use: Never    Drug use: Never    Sexual activity: Never     Family History   Problem Relation Age of Onset    Alzheimer's disease Mother     Diabetes Mother     Stroke Mother     Meniere's disease Mother     Leukemia Father     Cancer Father     Parkinsonism Brother        Review of Systems   Constitutional: Negative.  Negative for activity change, appetite change, chills and diaphoresis.   HENT: Negative.  Negative for congestion, ear pain, hearing loss and postnasal drip.    Eyes:  Negative for itching.   Respiratory:  Negative for chest tightness and shortness of breath.    Cardiovascular:  Negative for chest pain.   Gastrointestinal:  Negative for abdominal pain.   Endocrine: Negative for polydipsia.   Genitourinary:  Negative for frequency.   Musculoskeletal:  Negative for back pain.   Neurological:  Negative for headaches.       Objective:     /74 (BP Location: Left arm, Patient Position: Sitting, BP Method: Large (Manual))   Pulse 77   Resp 18   Ht 5' 6" (1.676 m)   Wt 72.6 kg (160 lb)   SpO2 99%   BMI 25.82 kg/m²     Physical Exam  Constitutional:       Appearance: Normal appearance. She is not ill-appearing.   HENT:      Head: Normocephalic and atraumatic.      Right Ear: External ear normal.      Left Ear: External ear normal.      Nose: Nose normal.      Mouth/Throat:      Mouth: Mucous membranes are moist.      Pharynx: Oropharynx is clear.   Eyes:      Pupils: Pupils are equal, round, and reactive to light.   Neck:      Vascular: No carotid bruit.   Cardiovascular:      Rate and Rhythm: Normal rate and regular rhythm.      " Heart sounds: Normal heart sounds. No murmur heard.     No gallop.   Pulmonary:      Effort: Pulmonary effort is normal. No respiratory distress.      Breath sounds: Normal breath sounds. No wheezing or rales.   Abdominal:      Palpations: Abdomen is soft.   Musculoskeletal:      Cervical back: Normal range of motion and neck supple.   Skin:     General: Skin is warm and dry.   Neurological:      General: No focal deficit present.      Mental Status: She is alert and oriented to person, place, and time. Mental status is at baseline.   Psychiatric:         Mood and Affect: Mood normal.         Behavior: Behavior normal.         Thought Content: Thought content normal.         Judgment: Judgment normal.         1. Allergic rhinitis, unspecified seasonality, unspecified trigger    2. Urticaria    3. Arthritis    4. Anxiety    5. Tobacco abuse    6. Disorder of iron metabolism, unspecified    7. Chronic passive congestion of liver    8. Disorder of muscle, unspecified        Plan:     Problem List Items Addressed This Visit          Psychiatric    Anxiety       ENT    Allergic rhinitis - Primary       Derm    Urticaria    Relevant Orders    Hemoglobin A1C    CBC Auto Differential    Comprehensive Metabolic Panel    Lipid Panel    TSH    Urinalysis       Orthopedic    Arthritis       Other    Tobacco abuse     Other Visit Diagnoses       Disorder of iron metabolism, unspecified        Relevant Orders    Hemoglobin A1C    Chronic passive congestion of liver        Relevant Orders    Lipid Panel    Disorder of muscle, unspecified        Relevant Orders    TSH          No follow-ups on file.  Lab owo  6m fu  education    I am having Krupa James maintain her aspirin, chlordiazepoxide, ZyrTEC-D, levothyroxine, and pravastatin.    Krupa was seen today for follow-up.    Diagnoses and all orders for this visit:    Allergic rhinitis, unspecified seasonality, unspecified trigger    Urticaria  -     Hemoglobin A1C;  Future  -     CBC Auto Differential; Future  -     Comprehensive Metabolic Panel; Standing  -     Lipid Panel; Standing  -     TSH; Standing  -     Urinalysis; Standing    Arthritis    Anxiety    Tobacco abuse    Disorder of iron metabolism, unspecified  -     Hemoglobin A1C; Future    Chronic passive congestion of liver  -     Lipid Panel; Standing    Disorder of muscle, unspecified  -     TSH; Standing         [unfilled]  Orders Placed This Encounter   Procedures    Hemoglobin A1C     Standing Status:   Future     Number of Occurrences:   1     Standing Expiration Date:   2/18/2025    CBC Auto Differential     Standing Status:   Future     Number of Occurrences:   1     Standing Expiration Date:   2/18/2025    Comprehensive Metabolic Panel     Standing Status:   Standing     Number of Occurrences:   4     Standing Expiration Date:   12/21/2024    Lipid Panel     Standing Status:   Standing     Number of Occurrences:   2     Standing Expiration Date:   2/18/2025    TSH     Standing Status:   Standing     Number of Occurrences:   2     Standing Expiration Date:   2/18/2025    Urinalysis     Standing Status:   Standing     Number of Occurrences:   1     Standing Expiration Date:   6/21/2025     Order Specific Question:   Collection Type     Answer:   Urine, Clean Catch

## 2024-01-02 RX ORDER — CETIRIZINE HYDROCHLORIDE AND PSEUDOEPHEDRINE HYDROCHLORIDE 5; 120 MG/1; MG/1
1 TABLET, FILM COATED, EXTENDED RELEASE ORAL 2 TIMES DAILY PRN
Qty: 60 TABLET | Refills: 3 | Status: SHIPPED | OUTPATIENT
Start: 2024-01-02

## 2024-02-09 DIAGNOSIS — Z71.89 COMPLEX CARE COORDINATION: ICD-10-CM

## 2024-06-10 RX ORDER — PRAVASTATIN SODIUM 40 MG/1
40 TABLET ORAL
Qty: 90 TABLET | Refills: 0 | Status: SHIPPED | OUTPATIENT
Start: 2024-06-10 | End: 2024-06-13 | Stop reason: SDUPTHER

## 2024-06-10 RX ORDER — LEVOTHYROXINE SODIUM 50 UG/1
50 TABLET ORAL
Qty: 90 TABLET | Refills: 0 | Status: SHIPPED | OUTPATIENT
Start: 2024-06-10

## 2024-06-13 ENCOUNTER — OFFICE VISIT (OUTPATIENT)
Dept: PRIMARY CARE CLINIC | Facility: CLINIC | Age: 74
End: 2024-06-13
Payer: MEDICARE

## 2024-06-13 VITALS
SYSTOLIC BLOOD PRESSURE: 130 MMHG | DIASTOLIC BLOOD PRESSURE: 82 MMHG | HEIGHT: 66 IN | BODY MASS INDEX: 25.07 KG/M2 | RESPIRATION RATE: 18 BRPM | OXYGEN SATURATION: 98 % | HEART RATE: 69 BPM | WEIGHT: 156 LBS

## 2024-06-13 DIAGNOSIS — J30.9 ALLERGIC RHINITIS, UNSPECIFIED SEASONALITY, UNSPECIFIED TRIGGER: ICD-10-CM

## 2024-06-13 DIAGNOSIS — M19.90 ARTHRITIS: Primary | ICD-10-CM

## 2024-06-13 DIAGNOSIS — F41.9 ANXIETY: ICD-10-CM

## 2024-06-13 DIAGNOSIS — Z72.0 TOBACCO ABUSE: ICD-10-CM

## 2024-06-13 PROCEDURE — 99214 OFFICE O/P EST MOD 30 MIN: CPT | Mod: ,,, | Performed by: FAMILY MEDICINE

## 2024-06-13 RX ORDER — PRAVASTATIN SODIUM 40 MG/1
40 TABLET ORAL DAILY
Qty: 90 TABLET | Refills: 3 | Status: SHIPPED | OUTPATIENT
Start: 2024-06-13

## 2024-06-13 NOTE — PROGRESS NOTES
Subjective:      Patient ID: Krupa James is a 74 y.o. female.    Chief Complaint: Follow-up (6mon. Ck-up)    Krupa James a 74 y.o. female presents for follow up on all regular problems which are reviewed and discussed.   Brother   Problem List Items Addressed This Visit          Psychiatric    Anxiety       ENT    Allergic rhinitis       Orthopedic    Arthritis - Primary       Other    Tobacco abuse       Past Medical History:  Past Medical History:   Diagnosis Date    Anxiety     Arthritis     Stroke      Past Surgical History:   Procedure Laterality Date    CHOLECYSTECTOMY       Review of patient's allergies indicates:   Allergen Reactions    Iodine and iodide containing products     Latex, natural rubber      Current Outpatient Medications on File Prior to Visit   Medication Sig Dispense Refill    aspirin 325 MG tablet Take 325 mg by mouth once daily.      chlordiazepoxide (LIBRIUM) 5 MG capsule Take 1 capsule (5 mg total) by mouth 3 (three) times daily as needed for Anxiety. 90 capsule 5    levothyroxine (SYNTHROID) 50 MCG tablet TAKE 1 TABLET BY MOUTH ONCE DAILY BEFORE BREAKFAST 90 tablet 0    ZYRTEC-D 5-120 mg Tb12 Take 1 tablet by mouth 2 (two) times daily as needed. 60 tablet 3    [DISCONTINUED] pravastatin (PRAVACHOL) 40 MG tablet Take 1 tablet by mouth once daily 90 tablet 0     No current facility-administered medications on file prior to visit.     Social History     Socioeconomic History    Marital status: Legally    Tobacco Use    Smoking status: Every Day     Current packs/day: 1.00     Average packs/day: 1 pack/day for 15.0 years (15.0 ttl pk-yrs)     Types: Cigarettes    Smokeless tobacco: Never   Substance and Sexual Activity    Alcohol use: Never    Drug use: Never    Sexual activity: Never     Family History   Problem Relation Name Age of Onset    Alzheimer's disease Mother Rpw     Diabetes Mother Rpw     Stroke Mother Rpw     Meniere's disease Mother Rpw     Leukemia  "Father Fet     Cancer Father Fet     Parkinsonism Brother         Review of Systems   Constitutional: Negative.  Negative for activity change, appetite change, chills and diaphoresis.   HENT: Negative.  Negative for congestion, ear pain, hearing loss and postnasal drip.    Eyes:  Negative for itching.   Respiratory:  Negative for chest tightness and shortness of breath.    Cardiovascular:  Negative for chest pain.   Gastrointestinal:  Negative for abdominal pain.   Endocrine: Negative for polydipsia.   Genitourinary:  Negative for frequency.   Musculoskeletal:  Negative for back pain.   Neurological:  Negative for headaches.     Objective:     /82 (BP Location: Right arm, Patient Position: Sitting, BP Method: Large (Manual))   Pulse 69   Resp 18   Ht 5' 6" (1.676 m)   Wt 70.8 kg (156 lb)   SpO2 98%   BMI 25.18 kg/m²     Physical Exam  Constitutional:       Appearance: Normal appearance. She is obese.   HENT:      Head: Normocephalic and atraumatic.      Right Ear: External ear normal.      Left Ear: External ear normal.      Nose: Nose normal.      Mouth/Throat:      Mouth: Mucous membranes are moist.      Pharynx: Oropharynx is clear.   Eyes:      Pupils: Pupils are equal, round, and reactive to light.   Neck:      Vascular: No carotid bruit.   Cardiovascular:      Rate and Rhythm: Normal rate and regular rhythm.      Heart sounds: No murmur heard.     No gallop.   Pulmonary:      Effort: Pulmonary effort is normal. No respiratory distress.      Breath sounds: Normal breath sounds. No wheezing or rales.   Abdominal:      Palpations: Abdomen is soft.   Musculoskeletal:      Cervical back: Normal range of motion and neck supple.   Skin:     General: Skin is warm and dry.   Neurological:      General: No focal deficit present.      Mental Status: She is alert and oriented to person, place, and time. Mental status is at baseline.   Psychiatric:         Mood and Affect: Mood normal.         Behavior: " Behavior normal.         Thought Content: Thought content normal.         Judgment: Judgment normal.   Assessment:     1. Arthritis    2. Tobacco abuse    3. Allergic rhinitis, unspecified seasonality, unspecified trigger    4. Anxiety        Plan:     Problem List Items Addressed This Visit          Psychiatric    Anxiety       ENT    Allergic rhinitis       Orthopedic    Arthritis - Primary       Other    Tobacco abuse     No follow-ups on file.  Dc smoking  Do lab  Fu 6m    I have changed Krupa James's pravastatin. I am also having her maintain her aspirin, chlordiazepoxide, ZyrTEC-D, and levothyroxine.    Krupa was seen today for follow-up.    Diagnoses and all orders for this visit:    Arthritis    Tobacco abuse    Allergic rhinitis, unspecified seasonality, unspecified trigger    Anxiety    Other orders  -     pravastatin (PRAVACHOL) 40 MG tablet; Take 1 tablet (40 mg total) by mouth once daily.      Medications Ordered This Encounter   Medications    pravastatin (PRAVACHOL) 40 MG tablet     Sig: Take 1 tablet (40 mg total) by mouth once daily.     Dispense:  90 tablet     Refill:  3     [unfilled]  No orders of the defined types were placed in this encounter.

## 2024-08-20 DIAGNOSIS — Z71.89 COMPLEX CARE COORDINATION: ICD-10-CM

## 2024-09-03 RX ORDER — LEVOTHYROXINE SODIUM 50 UG/1
50 TABLET ORAL
Qty: 90 TABLET | Refills: 3 | Status: SHIPPED | OUTPATIENT
Start: 2024-09-03

## 2024-09-26 RX ORDER — CHLORDIAZEPOXIDE HYDROCHLORIDE 5 MG/1
5 CAPSULE, GELATIN COATED ORAL 3 TIMES DAILY PRN
Qty: 90 CAPSULE | Refills: 5 | Status: SHIPPED | OUTPATIENT
Start: 2024-09-26 | End: 2025-03-25

## 2024-09-26 RX ORDER — CHLORDIAZEPOXIDE HYDROCHLORIDE 5 MG/1
5 CAPSULE, GELATIN COATED ORAL 3 TIMES DAILY PRN
Qty: 90 CAPSULE | Refills: 5 | Status: SHIPPED | OUTPATIENT
Start: 2024-09-26 | End: 2024-09-26 | Stop reason: SDUPTHER

## 2025-01-15 ENCOUNTER — OFFICE VISIT (OUTPATIENT)
Dept: FAMILY MEDICINE | Facility: CLINIC | Age: 75
End: 2025-01-15
Payer: MEDICARE

## 2025-01-15 VITALS
HEIGHT: 66 IN | BODY MASS INDEX: 25.52 KG/M2 | DIASTOLIC BLOOD PRESSURE: 81 MMHG | HEART RATE: 71 BPM | OXYGEN SATURATION: 97 % | WEIGHT: 158.81 LBS | SYSTOLIC BLOOD PRESSURE: 121 MMHG | TEMPERATURE: 98 F

## 2025-01-15 DIAGNOSIS — Z11.59 NEED FOR HEPATITIS C SCREENING TEST: ICD-10-CM

## 2025-01-15 DIAGNOSIS — E03.9 HYPOTHYROIDISM, UNSPECIFIED TYPE: ICD-10-CM

## 2025-01-15 DIAGNOSIS — D64.9 ANEMIA, UNSPECIFIED TYPE: ICD-10-CM

## 2025-01-15 DIAGNOSIS — M19.90 ARTHRITIS: ICD-10-CM

## 2025-01-15 DIAGNOSIS — Z72.0 TOBACCO ABUSE: ICD-10-CM

## 2025-01-15 DIAGNOSIS — R73.01 IMPAIRED FASTING GLUCOSE: ICD-10-CM

## 2025-01-15 DIAGNOSIS — E78.5 HYPERLIPIDEMIA, UNSPECIFIED HYPERLIPIDEMIA TYPE: ICD-10-CM

## 2025-01-15 DIAGNOSIS — F41.9 ANXIETY: ICD-10-CM

## 2025-01-15 DIAGNOSIS — J30.9 ALLERGIC RHINITIS, UNSPECIFIED SEASONALITY, UNSPECIFIED TRIGGER: ICD-10-CM

## 2025-01-15 DIAGNOSIS — N63.14 MASS OF LOWER INNER QUADRANT OF RIGHT BREAST: Primary | ICD-10-CM

## 2025-01-15 LAB
ALBUMIN SERPL BCP-MCNC: 3.9 G/DL (ref 3.4–4.8)
ALBUMIN/GLOB SERPL: 1.3 {RATIO}
ALP SERPL-CCNC: 62 U/L (ref 40–150)
ALT SERPL W P-5'-P-CCNC: 13 U/L
ANION GAP SERPL CALCULATED.3IONS-SCNC: 11 MMOL/L (ref 7–16)
AST SERPL W P-5'-P-CCNC: 48 U/L (ref 5–34)
BASOPHILS # BLD AUTO: 0.14 K/UL (ref 0–0.2)
BASOPHILS NFR BLD AUTO: 1.8 % (ref 0–1)
BILIRUB SERPL-MCNC: 0.3 MG/DL
BUN SERPL-MCNC: 13 MG/DL (ref 10–20)
BUN/CREAT SERPL: 17 (ref 6–20)
CALCIUM SERPL-MCNC: 9.1 MG/DL (ref 8.4–10.2)
CHLORIDE SERPL-SCNC: 108 MMOL/L (ref 98–107)
CHOLEST SERPL-MCNC: 172 MG/DL
CHOLEST/HDLC SERPL: 3.7 {RATIO}
CO2 SERPL-SCNC: 24 MMOL/L (ref 23–31)
CREAT SERPL-MCNC: 0.77 MG/DL (ref 0.55–1.02)
DIFFERENTIAL METHOD BLD: ABNORMAL
EGFR (NO RACE VARIABLE) (RUSH/TITUS): 82 ML/MIN/1.73M2
EOSINOPHIL # BLD AUTO: 0.83 K/UL (ref 0–0.5)
EOSINOPHIL NFR BLD AUTO: 10.5 % (ref 1–4)
ERYTHROCYTE [DISTWIDTH] IN BLOOD BY AUTOMATED COUNT: 12.9 % (ref 11.5–14.5)
EST. AVERAGE GLUCOSE BLD GHB EST-MCNC: 111 MG/DL
GLOBULIN SER-MCNC: 3.1 G/DL (ref 2–4)
GLUCOSE SERPL-MCNC: 96 MG/DL (ref 82–115)
HBA1C MFR BLD HPLC: 5.5 %
HCT VFR BLD AUTO: 44.7 % (ref 38–47)
HCV AB SER QL: NORMAL
HDLC SERPL-MCNC: 47 MG/DL (ref 35–60)
HGB BLD-MCNC: 14.3 G/DL (ref 12–16)
IMM GRANULOCYTES # BLD AUTO: 0.01 K/UL (ref 0–0.04)
IMM GRANULOCYTES NFR BLD: 0.1 % (ref 0–0.4)
LDLC SERPL CALC-MCNC: 95 MG/DL
LDLC/HDLC SERPL: 2 {RATIO}
LYMPHOCYTES # BLD AUTO: 3.14 K/UL (ref 1–4.8)
LYMPHOCYTES NFR BLD AUTO: 39.8 % (ref 27–41)
MCH RBC QN AUTO: 31 PG (ref 27–31)
MCHC RBC AUTO-ENTMCNC: 32 G/DL (ref 32–36)
MCV RBC AUTO: 97 FL (ref 80–96)
MONOCYTES # BLD AUTO: 0.42 K/UL (ref 0–0.8)
MONOCYTES NFR BLD AUTO: 5.3 % (ref 2–6)
MPC BLD CALC-MCNC: 12.7 FL (ref 9.4–12.4)
NEUTROPHILS # BLD AUTO: 3.34 K/UL (ref 1.8–7.7)
NEUTROPHILS NFR BLD AUTO: 42.5 % (ref 53–65)
NONHDLC SERPL-MCNC: 125 MG/DL
NRBC # BLD AUTO: 0 X10E3/UL
NRBC, AUTO (.00): 0 %
PLATELET # BLD AUTO: 273 K/UL (ref 150–400)
POTASSIUM SERPL-SCNC: 4.2 MMOL/L (ref 3.5–5.1)
PROT SERPL-MCNC: 7 G/DL (ref 5.8–7.6)
RBC # BLD AUTO: 4.61 M/UL (ref 4.2–5.4)
SODIUM SERPL-SCNC: 139 MMOL/L (ref 136–145)
TRIGL SERPL-MCNC: 151 MG/DL (ref 37–140)
VLDLC SERPL-MCNC: 30 MG/DL
WBC # BLD AUTO: 7.88 K/UL (ref 4.5–11)

## 2025-01-15 PROCEDURE — 99214 OFFICE O/P EST MOD 30 MIN: CPT | Mod: ,,,

## 2025-01-15 PROCEDURE — 83036 HEMOGLOBIN GLYCOSYLATED A1C: CPT | Mod: ,,, | Performed by: CLINICAL MEDICAL LABORATORY

## 2025-01-15 PROCEDURE — 85025 COMPLETE CBC W/AUTO DIFF WBC: CPT | Mod: ,,, | Performed by: CLINICAL MEDICAL LABORATORY

## 2025-01-15 PROCEDURE — 80061 LIPID PANEL: CPT | Mod: ,,, | Performed by: CLINICAL MEDICAL LABORATORY

## 2025-01-15 PROCEDURE — 86803 HEPATITIS C AB TEST: CPT | Mod: ,,, | Performed by: CLINICAL MEDICAL LABORATORY

## 2025-01-15 PROCEDURE — 80053 COMPREHEN METABOLIC PANEL: CPT | Mod: ,,, | Performed by: CLINICAL MEDICAL LABORATORY

## 2025-01-15 RX ORDER — PRAVASTATIN SODIUM 40 MG/1
40 TABLET ORAL DAILY
Qty: 90 TABLET | Refills: 3 | Status: SHIPPED | OUTPATIENT
Start: 2025-01-15

## 2025-01-15 NOTE — PATIENT INSTRUCTIONS
Patient refuses DEXA scan, colonoscopy, Tdap, pneumonia vaccine.   diagnostic mammogram 1/30/25 @ 1:30  Labs today  Medication refilled  Advised on smoking cessation  Follow up in 3 months and as needed.
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
## 2025-01-30 ENCOUNTER — TELEPHONE (OUTPATIENT)
Dept: FAMILY MEDICINE | Facility: CLINIC | Age: 75
End: 2025-01-30
Payer: MEDICARE

## 2025-01-30 ENCOUNTER — HOSPITAL ENCOUNTER (OUTPATIENT)
Dept: RADIOLOGY | Facility: HOSPITAL | Age: 75
Discharge: HOME | End: 2025-01-30
Attending: RADIOLOGY
Payer: MEDICARE

## 2025-01-30 ENCOUNTER — HOSPITAL ENCOUNTER (OUTPATIENT)
Dept: RADIOLOGY | Facility: HOSPITAL | Age: 75
Discharge: HOME | End: 2025-01-30
Payer: MEDICARE

## 2025-01-30 DIAGNOSIS — R92.8 ABNORMAL MAMMOGRAM: ICD-10-CM

## 2025-01-30 DIAGNOSIS — N63.14 MASS OF LOWER INNER QUADRANT OF RIGHT BREAST: ICD-10-CM

## 2025-01-30 PROCEDURE — 77062 BREAST TOMOSYNTHESIS BI: CPT | Mod: 26,,, | Performed by: RADIOLOGY

## 2025-01-30 PROCEDURE — 77062 BREAST TOMOSYNTHESIS BI: CPT | Mod: TC

## 2025-01-30 PROCEDURE — 76642 ULTRASOUND BREAST LIMITED: CPT | Mod: TC,RT

## 2025-01-30 PROCEDURE — 76642 ULTRASOUND BREAST LIMITED: CPT | Mod: 26,RT,, | Performed by: RADIOLOGY

## 2025-01-30 PROCEDURE — 77066 DX MAMMO INCL CAD BI: CPT | Mod: 26,,, | Performed by: RADIOLOGY

## 2025-02-04 ENCOUNTER — OFFICE VISIT (OUTPATIENT)
Dept: CARDIOLOGY | Facility: CLINIC | Age: 75
End: 2025-02-04
Payer: MEDICARE

## 2025-02-04 VITALS
OXYGEN SATURATION: 97 % | DIASTOLIC BLOOD PRESSURE: 78 MMHG | SYSTOLIC BLOOD PRESSURE: 102 MMHG | WEIGHT: 155.63 LBS | HEART RATE: 90 BPM | BODY MASS INDEX: 25.11 KG/M2

## 2025-02-04 DIAGNOSIS — Z79.02 ANTIPLATELET OR ANTITHROMBOTIC LONG-TERM USE: Primary | ICD-10-CM

## 2025-02-04 DIAGNOSIS — I34.1 MITRAL VALVE PROLAPSE: ICD-10-CM

## 2025-02-04 DIAGNOSIS — Z01.818 ENCOUNTER FOR ECHOCARDIOGRAM BEFORE INITIATION OF CHEMOTHERAPY: ICD-10-CM

## 2025-02-04 DIAGNOSIS — I10 HYPERTENSION, UNSPECIFIED TYPE: ICD-10-CM

## 2025-02-04 LAB
OHS QRS DURATION: 82 MS
OHS QTC CALCULATION: 436 MS

## 2025-02-04 PROCEDURE — 93005 ELECTROCARDIOGRAM TRACING: CPT | Mod: PBBFAC | Performed by: HOSPITALIST

## 2025-02-04 PROCEDURE — 93010 ELECTROCARDIOGRAM REPORT: CPT | Mod: S$PBB,,, | Performed by: HOSPITALIST

## 2025-02-04 PROCEDURE — 99999 PR PBB SHADOW E&M-EST. PATIENT-LVL IV: CPT | Mod: PBBFAC,,, | Performed by: HOSPITALIST

## 2025-02-04 PROCEDURE — 99214 OFFICE O/P EST MOD 30 MIN: CPT | Mod: PBBFAC,25 | Performed by: HOSPITALIST

## 2025-02-04 PROCEDURE — 99205 OFFICE O/P NEW HI 60 MIN: CPT | Mod: S$PBB,,, | Performed by: HOSPITALIST

## 2025-02-04 RX ORDER — IBUPROFEN 200 MG
200 TABLET ORAL EVERY 6 HOURS PRN
COMMUNITY

## 2025-02-04 RX ORDER — CLOPIDOGREL BISULFATE 75 MG/1
75 TABLET ORAL DAILY
Qty: 90 TABLET | Refills: 3 | Status: SHIPPED | OUTPATIENT
Start: 2025-02-04 | End: 2026-02-04

## 2025-02-04 NOTE — PATIENT INSTRUCTIONS
-Echo: instructions given, Mali Laguna's nurse will call on 2/5/25 with the date and time of the test  -Follow up with Dr. Laguna 6 month check up: our office will mail you a reminder to make an appointment the end of June (Dr. Laguna's schedule is not open that far as of today)

## 2025-02-04 NOTE — PROGRESS NOTES
CARDIOVASCULAR CONSULTATION        REASON FOR CONSULT:   Krupa James is a 74 y.o. female who presents for   Chief Complaint   Patient presents with    Consult     Dr. Chow wanted patient to see Cardiology before seeing Dr. Abdi for Breast cancer.   Patient states Dr. Chow wanted her to discuss  and if there is something else she can take to get out of system quicker or can stop for procedure  Saw Dr. Diehl prior for MVP. 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 female who  has a past medical history of Anxiety, Arthritis, Hyperlipidemia, Hypothyroidism, Mitral valve prolapse, and Stroke.    Today we discussed the patient's cardiac symptoms at length. The patient has a recent diagnosis of BI-RADS 5 lesion of her R breast that is highly suggestive of malignancy. Biopsy recommended, however deferred as patient is on full dose aspirin that was begun in 1987 for CVA that occurred during her pregnancy. She was told the stroke was cardioembolic as she also was noted to have mitral valve prolapse, however this is neither sufficient to rule-in nor rule-out cardioembolism. Moreover, aspirin would not be the drug of choice in this circumstance. In 1987, CVA w/ a high suspicion of cardioembolism etiology would have been treated w/ warfarin by guideline.     Nonetheless, for secondary prevention of ischemic (non-cardioembolic) stroke, we discussed that plavix monotherapy has a superior effectiveness and safety profile (*relative to ASA 81 daily*). There are very few indications for ASA 325mg PO daily at present.     With regards to the patient's upcoming biopsy, I recommended the patient stop her aspirin 5 or more days in advance and we subsequently discussed discontinuation of aspirin as secondary prevention for stroke in favor of (by current guideline), plavix monotherapy. We  discussed risk:benefit of single antiplatelet therapy w/ plavix for longterm secondary prevention. The patient has no history of bleeding complications on full dose aspirin, feel it is reasonable to simply resume SAPT w/ plavix 75mg po daily (no load) 24-48h following her biopsy (defer to  preference). The patient can perform >6 metabolic equivalents per her own account and thus typically would not require further risk stratification from a cardiac standpoint.           PAST MEDICAL HISTORY:     Past Medical History:   Diagnosis Date    Anxiety     Arthritis     Hyperlipidemia     Hypothyroidism     Mitral valve prolapse     Stroke        PAST SURGICAL HISTORY:     Past Surgical History:   Procedure Laterality Date    CHOLECYSTECTOMY         ALLERGIES AND MEDICATION:     Review of patient's allergies indicates:   Allergen Reactions    Nickel Hives    Iodine and iodide containing products     Latex, natural rubber         Medication List            Accurate as of February 4, 2025  4:43 PM. If you have any questions, ask your nurse or doctor.                CHANGE how you take these medications      pravastatin 40 MG tablet  Commonly known as: PRAVACHOL  Take 1 tablet (40 mg total) by mouth once daily.  What changed: when to take this            CONTINUE taking these medications      aspirin 325 MG tablet     chlordiazepoxide 5 MG capsule  Commonly known as: LIBRIUM  Take 1 capsule (5 mg total) by mouth 3 (three) times daily as needed for Anxiety.     ibuprofen 200 MG tablet  Commonly known as: ADVIL,MOTRIN     levothyroxine 50 MCG tablet  Commonly known as: SYNTHROID  TAKE 1 TABLET BY MOUTH ONCE DAILY BEFORE BREAKFAST     XYZAL ORAL     ZyrTEC-D 5-120 mg Tb12  Generic drug: cetirizine-pseudoephedrine  Take 1 tablet by mouth 2 (two) times daily as needed.              SOCIAL HISTORY:     Social History     Socioeconomic History    Marital status: Legally    Tobacco Use    Smoking status: Every Day      Current packs/day: 1.00     Average packs/day: 1 pack/day for 15.0 years (15.0 ttl pk-yrs)     Types: Cigarettes     Passive exposure: Current    Smokeless tobacco: Never   Substance and Sexual Activity    Alcohol use: Never    Drug use: Never    Sexual activity: Never     Social Drivers of Health     Financial Resource Strain: Low Risk  (1/9/2025)    Overall Financial Resource Strain (CARDIA)     Difficulty of Paying Living Expenses: Not very hard   Food Insecurity: No Food Insecurity (1/9/2025)    Hunger Vital Sign     Worried About Running Out of Food in the Last Year: Never true     Ran Out of Food in the Last Year: Never true   Physical Activity: Unknown (1/9/2025)    Exercise Vital Sign     Days of Exercise per Week: Patient declined   Stress: No Stress Concern Present (1/9/2025)    [employer] - Occupational Stress Questionnaire     Feeling of Stress : Only a little   Housing Stability: Unknown (1/9/2025)    Housing Stability Vital Sign     Unable to Pay for Housing in the Last Year: No       FAMILY HISTORY:     Family History   Problem Relation Name Age of Onset    Alzheimer's disease Mother Rpw     Diabetes Mother Rpw     Stroke Mother Rpw     Meniere's disease Mother Rpw     Leukemia Father Fet     Cancer Father Fet     Parkinsonism Brother         REVIEW OF SYSTEMS:       Cardiology focused 12-point ROS otherwise unremarkable except for as mentioned in HPI and A/P.   Pertinent Positives and Negatives documented herein.       PHYSICAL EXAM:     Vitals:    02/04/25 1510   BP: 102/78   Pulse: 90    Body mass index is 25.11 kg/m².  Weight: 70.6 kg (155 lb 9.6 oz)         Gen: NAD  HEENT: NC/AT, MMM  Chest: normal wob  CV: RRR, no m/g/r  Ext: mild/trace edema of BLEs  Skin: no rash  Psych: AMAA  Neuro: [facility]      DATA:     Laboratory:  CBC:  Recent Labs   Lab 06/21/23  0928 12/21/23  0954 01/15/25  1131   WBC 7.88 7.28 7.88   Hemoglobin 14.5 14.7 14.3   Hematocrit 44.7 45.1 44.7  "  Platelet Count 280 289 273       CHEMISTRIES:  Recent Labs   Lab 06/21/22  1514 06/21/23  0928 12/21/23  0954 06/13/24  1135 01/15/25  1131   Glucose 81   < > 85 103 96   Sodium 140   < > 143 140 139   Potassium 4.1   < > 3.7 4.9 4.2   BUN 12   < > 14 12 13   Creatinine 0.83   < > 0.84 0.91 0.77   eGFR 72  --   --   --   --    Calcium 9.2   < > 9.5 9.3 9.1    < > = values in this interval not displayed.       CARDIAC BIOMARKERS:      No results found for: "BNP"    COAGS:        LIPIDS/LFTS:  Recent Labs   Lab 12/21/23  0954 06/13/24  1135 01/15/25  1131   Cholesterol 164 166 172   Triglycerides 129 101 151 H   HDL Cholesterol 51 51 47   LDL Calculated 87 95 95   Non- 115 125   AST 10 L 20 48 H   ALT 16 20 13       Hemoglobin A1C   Date Value Ref Range Status   01/15/2025 5.5 <=7.0 % Final     Comment:       Normal:               <5.7%  Pre-Diabetic:       5.7% to 6.4%  Diabetic:             >6.4%  Diabetic Goal:     <7%   12/21/2023 5.2 4.5 - 6.6 % Final     Comment:       Normal:               <5.7%  Pre-Diabetic:       5.7% to 6.4%  Diabetic:             >6.4%  Diabetic Goal:     <7%   06/21/2023 5.4 4.5 - 6.6 % Final     Comment:       Normal:               <5.7%  Pre-Diabetic:       5.7% to 6.4%  Diabetic:             >6.4%  Diabetic Goal:     <7%       TSH  Recent Labs   Lab 06/21/23  0928 12/21/23  0954 06/13/24  1135   TSH 1.510 1.330 1.060       The ASCVD Risk score (Shane LEPE, et al., 2019) failed to calculate for the following reasons:    Risk score cannot be calculated because patient has a medical history suggesting prior/existing ASCVD     IMAGING  No orders to display       ASSESSMENT AND PLAN     Patient Active Problem List   Diagnosis    Urticaria    Allergic rhinitis    Anxiety    Arthritis    Tobacco abuse    Hyperlipidemia    Impaired fasting glucose    Mitral valve prolapse    Antiplatelet or antithrombotic long-term use    Encounter for echocardiogram before initiation of chemotherapy "         Orders Placed This Encounter   Procedures    EKG 12-lead    Echo Saline Bubble? No; Ultrasound enhancing contrast? (as needed for endomyocardial border enhancement)       1. Hypertension, unspecified type  - EKG 12-lead; Future  - EKG 12-lead    2. Mitral valve prolapse  - Echo Saline Bubble? No; Ultrasound enhancing contrast? (as needed for endomyocardial border enhancement); Future    3. Antiplatelet or antithrombotic long-term use    4. Encounter for echocardiogram before initiation of chemotherapy      Problem Noted   Mitral Valve Prolapse 2/4/2025    History of MVP; no indication of progression, though no recent echo  -TTE to evaluate mitral valve as well as evaluate EF, LV strain for any future (e.g. neoadjuvant chemo) cardiotoxic medication exposure      Antiplatelet Or Antithrombotic Long-Term Use 2/4/2025    -no indication for full dose aspirin  -present guideline recommends single antiplatelet therapy; plavix monotherapy has the best efficacy and safety profile (over and above ASA 81 mg po daily)  -recommend stopping aspirin 5 or more days prior to biopsy  -recommend starting plavix 75mg po daily 24-48h post biopsy or later if  prefers     Encounter for Echocardiogram Before Initiation of Chemotherapy 2/4/2025    Should patient's biopsy indicate need for hormonal and/or chemo therapy that is potentially cardiotoxic, the patient will require surveillance echoes during treatment to monitor (for an increase in) LV strain in an effort to mitigate risk of chemotherapy-mediated cardiomyopathy  -LV strain is a (much) more sensitive measure of chemo/drug cardiotoxicity than EF; begins increasing before reductions in EF are discernible; recommend baseline echo today to 'fingerprint' heart, then surveillance echo as needed per oncology should patient require hormone/chemo/radio therapy that carries risk of cardiotoxicity        Follow-up as needed. Tentatively 6 months.             This note was  dictated with the help of speech recognition software.  There might be un-intended errors and/or substitutions.

## 2025-02-05 ENCOUNTER — OFFICE VISIT (OUTPATIENT)
Dept: SURGERY | Facility: CLINIC | Age: 75
End: 2025-02-05
Attending: SURGERY
Payer: MEDICARE

## 2025-02-05 VITALS — WEIGHT: 155.63 LBS | HEIGHT: 66 IN | BODY MASS INDEX: 25.01 KG/M2

## 2025-02-05 DIAGNOSIS — N63.0 BREAST MASS IN FEMALE: Primary | ICD-10-CM

## 2025-02-05 PROCEDURE — 99215 OFFICE O/P EST HI 40 MIN: CPT | Mod: PBBFAC | Performed by: SURGERY

## 2025-02-05 PROCEDURE — 99214 OFFICE O/P EST MOD 30 MIN: CPT | Mod: S$PBB,,, | Performed by: SURGERY

## 2025-02-05 PROCEDURE — 99999 PR PBB SHADOW E&M-EST. PATIENT-LVL V: CPT | Mod: PBBFAC,,, | Performed by: SURGERY

## 2025-02-05 NOTE — PATIENT INSTRUCTIONS
"  [facility]  Instructions for surgery      Lymphoscintigraphy or "dye test" will be done at [facility] ,located on the first floor of the Medical Office building, on the day prior to surgery at 1:00 p.m. You do not need to be fasting for this test.   .         Your surgery is scheduled for 2/19/25 at [facility] on the 1st floor of the [facility] building.Your arrival time is 0600 a.m   You will be notified the day before  surgery verifying your arrival time for surgery.                                                                                                                                                                                                                                                                                                                                                                              Day of Surgery Instructions      Bring a list of all your medications with you the day of your surgery. You can also give the list to your doctor or nurse during your final clinic appointment before surgery.      Do not eat any solid foods or drink any liquids after 12:00 AM (midnight). This includes gum, hard candy, mints, and chewing tobacco.  Medications: Take any medications specified with a small sip of water the morning of your surgery.  Brush your teeth: You may brush your teeth and rinse your mouth. Do not swallow any water or toothpaste.  Clothing: A button front shirt and loose-fitting clothes are the most comfortable before and after surgery. We also recommend low-heeled shoes.  Hair: Avoid buns, ponytails, or hairpieces at the back of the head. Remove or avoid any clips, pins or bands that bind hair. Do not use hairspray. Before going to surgery, you will need to remove any wigs or hairpieces.  We will cover your hair during surgery. Your privacy regarding personal appearance will be respected.  Fingernails: Please be sure " to remove all nail polish before you arrive for surgery. We understand that tips, wraps, gels, etc., are expensive; however, we ask these products to be removed from at least one finger on each hand. Your fingertips are used to accurately monitor your oxygen level during surgery by a device called an oximeter.  Glasses and Contact Lenses: Wear glasses when possible. If contact lenses must be worn, bring a lens case and solution. If glasses are worn, bring a case for them.  Hearing Aids: If you rely on a hearing aid, wear it to the hospital on the day of surgery. This will ensure you can hear and understand everything we need to communicate with you.  Valuables: Jewelry, including body piercings, Dentures, money, and credit cards should be left at home. [facility] is not responsible for valuables that are not secured in our surgery center.  Makeup, Perfume, Creams, Lotions and Deodorants: Do not use any of these products on the day of surgery. Remove false eyelashes prior to surgery.  Implanted Medical Devices: If you have an implanted device, such as a pacemaker or AICD, bring the device information card (if you have it) with you.  Medical Equipment: If you have been fitted for a brace to wear after surgery or you have been given crutches, bring those with you to the surgery center.  Shower: Take a shower with Hibiclens® (chlorhexidine) (available over the counter). This reduces the chance of infection. PLEASE USE CHLORHEXIDINE WASH THE NIGHT BEFORE SURGERY AND THE MORNING OF SURGERY.  ONLY 2 VISITORS ARE ALLOWED WITH YOU ON DAY OF SURGERY.        Medication instructions:  You may take blood pressure medication with a small drink of water the morning of surgery.        IF YOU ARE ON ANY OF THESE BLOOD THINNERS, MAKE SURE YOUR PHYSICIAN IS AWARE.  Eliquis/Apixaban            Wafarin/Coumadin,Jantoven  Xarelto/Rivaroxaban      Pletal/Cilostazol  Plavix/Clopidogrel          Pradaxa/Dibigatran      If you are  diabetic      Follow the diabetic medicine instructions you received during your pre-operative visit.  DO NOT take your insulin or diabetic medications the morning of surgery.  When you arrive at the surgical center, be sure to tell the nurse you are diabetic.    The following blood sugar medications have to be stopped prior to surgery:    Hold 24 hours prior to surgery:    Libraglutide - Saxenda, Victoza  Lixisenatide --Adlxyin  Exenatide  --  Byetta  Empaglifozin--Jardiance  Sitaglitin--Januvia    Hold 1 week prior to surgery:    Semaglutide - Ozempic, Wegouy, Rybelsus  Dulaglutide - Trulicity  Tirzepatide - Mounjaro  Exenatide (extended release inj)-- Bydureon BCise      Hold 48 hours prior to surgery:    Metformin, Glucovance, Metaglip, Fortamet, Glucophage, Riomet, Avandamet, Glimepiride            Other Items to bring with you and know    Insurance card  Identification card such as 's license, passport, or other picture ID  Copy of your advance directives  List of medications and allergies, if not already provided  Name and phone number of person to contact if your condition changes significantly. YOU CANNOT DRIVE YOURSELF HOME FROM THE HOSPITAL THE DAY OF SURGERY.  PLEASE UNDERSTAND THAT OUR OFFICE DOES NOT GIVE PATHOLOGY RESULTS OR TEST RESULTS OVER THE PHONE. THIS WILL BE DISCUSSED WITH YOU ON YOUR FOLLOW UP APPOINTMENT.  IF YOU SUBMIT FMLA FORMS, IT WILL TAKE 3-7 DAYS TO COMPLETE THESE          Alcohol and Surgery  We want to help you prepare for and recover from surgery as quickly and safely as possible. Be open and honest with your provider about how many drinks you have per day. Excessive alcohol use is defined as drinking more than three drinks per day. It can affect the outcome of your surgery. Binge drinking (consuming large amounts of alcohol infrequently, such as on weekends) can also affect the outcome of your surgery.  Alcohol withdrawal  If you drink more than three drinks a day, you could  have a complication, called alcohol withdrawal, after surgery.  Alcohol withdrawal is a set of symptoms that people have when they suddenly stop drinking after using alcohol  for a long time. During withdrawal, a person's central nervous system overreacts. This can cause mild symptoms such as shakiness, sweating or hallucinating. It can also cause other more serious side effects. If not treated properly, alcohol withdrawal can cause potentially life-threatening complications after surgery. This can include tremors, seizures, hallucinations, delirium tremors, and even death. Untreated alcohol withdrawal often leads to a longer stay in the hospital, potentially in the Intensive Care Unit.  Chronic heavy drinking also can interfere with several organ systems and biochemical processes in the body.  This interference can cause serious, even life-threatening complications.  Your care team can offer alcohol withdrawal treatment to help:  Decrease the risk of seizures and delirium tremors after surgery  Decrease the risk we will need to restrain you for your own safety or the safety of others  Decrease your risk of falling after surgery  Reduce the use of potent sedative medications  Reduce the time you stay in the hospital after surgery  Reduce the time you might spend on a mechanical ventilator to help you breathe  Lower incidence of organ failure and biochemical complications  Talk to a member of your care team or your primary care physician about your alcohol use if you feel you may be at risk of any of these complications.        Smoking and Surgery  Quitting smoking is extremely important for a successful surgery and recovery. Cigarette smoking compromises your immune system. This increases your risk of an infection after surgery. Quitting the habit before surgery will decrease the surgical risks associated with smoking.

## 2025-02-07 PROBLEM — N63.0 BREAST MASS IN FEMALE: Status: ACTIVE | Noted: 2025-02-07

## 2025-02-07 NOTE — H&P (VIEW-ONLY)
Subjective:           Patient ID: Krupa James is a 74 y.o. female.    Chief Complaint: Mass (R breast)      74-year-old female patient presents with a history of palpable mass in the right lower quadrant of the breast.  She has noticed dimpling of the skin (retraction) and underwent a mammogram.  Mammogram returned as a category 5 abnormality with a spiculated irregular almost 2 cm mass in the 6 o'clock position right breast.  The patient has been referred to Cardiology for preoperative clearance prior to seeing me, and she reports that she was encouraged to stop aspirin and switch to Plavix.  She has been approved to be off of her Plavix prior to this procedure.  Regarding risk factors for breast cancer, the patient had menarche at age 19.  She took birth control pills and has had hormone replacement during menopause.  She had 3 pregnancies, the 1st prior to age 32.    Mass    family history includes Alzheimer's disease in her mother; Cancer in her father; Diabetes in her mother; Leukemia in her father; Meniere's disease in her mother; Parkinsonism in her brother; Stroke in her mother.    Past Medical History:   Diagnosis Date    Anxiety     Arthritis     Hyperlipidemia     Hypothyroidism     Mitral valve prolapse     Stroke         Current Outpatient Medications on File Prior to Visit   Medication Sig Dispense Refill    chlordiazepoxide (LIBRIUM) 5 MG capsule Take 1 capsule (5 mg total) by mouth 3 (three) times daily as needed for Anxiety. 90 capsule 5    ibuprofen (ADVIL,MOTRIN) 200 MG tablet Take 200 mg by mouth every 6 (six) hours as needed for Pain.      levocetirizine dihydrochloride (XYZAL ORAL) Take by mouth nightly as needed.      levothyroxine (SYNTHROID) 50 MCG tablet TAKE 1 TABLET BY MOUTH ONCE DAILY BEFORE BREAKFAST 90 tablet 3    pravastatin (PRAVACHOL) 40 MG tablet Take 1 tablet (40 mg total) by mouth once daily. (Patient taking differently: Take 40 mg by mouth nightly.) 90 tablet 3  "   ZYRTEC-D 5-120 mg Tb12 Take 1 tablet by mouth 2 (two) times daily as needed. 60 tablet 3    aspirin 325 MG tablet Take 325 mg by mouth once daily. (Patient not taking: Reported on 2/5/2025)      clopidogreL (PLAVIX) 75 mg tablet Take 1 tablet (75 mg total) by mouth once daily. No loading dose indicated. 90 tablet 3     No current facility-administered medications on file prior to visit.       Review of patient's allergies indicates:   Allergen Reactions    Nickel Hives    Iodine and iodide containing products     Latex, natural rubber        Past Surgical History:   Procedure Laterality Date    CHOLECYSTECTOMY           reports that she has been smoking cigarettes. She has a 15 pack-year smoking history. She has been exposed to tobacco smoke. She has never used smokeless tobacco. She reports that she does not drink alcohol and does not use drugs.       Review of Systems   All other systems reviewed and are negative.           Objective:      Ht 5' 5.5" (1.664 m)   Wt 70.6 kg (155 lb 9.6 oz)   BMI 25.50 kg/m²    Physical Exam  Constitutional:       General: She is awake.      Appearance: Normal appearance.   HENT:      Head: Atraumatic.   Cardiovascular:      Rate and Rhythm: Normal rate and regular rhythm.      Heart sounds: Normal heart sounds.   Pulmonary:      Effort: Pulmonary effort is normal.      Breath sounds: Normal breath sounds.   Chest:      Chest wall: No deformity.   Abdominal:      General: There is no distension.      Palpations: Abdomen is soft. There is no mass.      Tenderness: There is no abdominal tenderness.   Musculoskeletal:         General: No swelling.      Right lower leg: No edema.      Left lower leg: No edema.   Skin:     General: Skin is warm and dry.      Capillary Refill: Capillary refill takes less than 2 seconds.   Neurological:      General: No focal deficit present.      Mental Status: She is alert.   Psychiatric:         Mood and Affect: Mood normal.     "   Assessment/Plan:     Problem List Items Addressed This Visit          Renal/    Breast mass in female - Primary    Overview     Dimpling of skin on exam and appearance on mammogram are highly suggestive of malignancy         Current Assessment & Plan     Highly suspicious for cancer.  Plan excisional biopsy with frozen section, and proceed to definitive management of mastectomy, once confirmed.  We will also plan sentinel node evaluation to be performed immediately after frozen section confirmation.  We will not plan immediate MediPort as uncertain if she will need IV chemotherapy  Risks and benefits of surgery discussed include infection, bleeding, seroma, hematoma, possible further surgery, neuralgia, potential lymphedema of the arm.  She expresses understanding and wishes to proceed.         Relevant Orders    Case Request Operating Room: BIOPSY, LYMPH NODE, SENTINEL, EXCISION, MASS, BREAST (Completed)    [facility]

## 2025-02-07 NOTE — ASSESSMENT & PLAN NOTE
Highly suspicious for cancer.  Plan excisional biopsy with frozen section, and proceed to definitive management of mastectomy, once confirmed.  We will also plan sentinel node evaluation to be performed immediately after frozen section confirmation.  We will likely proceed with MediPort placement as well, since anticoagulants we will have to be held for surgery in the suspected need for we will be high.  Risks and benefits of surgery discussed include infection, bleeding, seroma, hematoma, possible further surgery, neuralgia, potential lymphedema of the arm.  She expresses understanding and wishes to proceed.

## 2025-02-11 ENCOUNTER — TELEPHONE (OUTPATIENT)
Dept: SURGERY | Facility: CLINIC | Age: 75
End: 2025-02-11
Payer: MEDICARE

## 2025-02-11 ENCOUNTER — TELEPHONE (OUTPATIENT)
Dept: CARDIOLOGY | Facility: CLINIC | Age: 75
End: 2025-02-11
Payer: MEDICARE

## 2025-02-11 NOTE — TELEPHONE ENCOUNTER
Called and informed patient her echo is on 2/26/25 at 8AM. Explained where to go to check in for echo.   She asked about where to go for her dye test the day before her surgery with Dr. Abdi. I am not sure so I told her I would reach out to Dr. Abdi's office to see if we can get her an answer.   Verbalized understanding.  -HW

## 2025-02-14 ENCOUNTER — TELEPHONE (OUTPATIENT)
Dept: SURGERY | Facility: CLINIC | Age: 75
End: 2025-02-14
Payer: MEDICARE

## 2025-02-14 NOTE — TELEPHONE ENCOUNTER
I spoke with patients daughter regarding surgery. Dr Saeed note in chart.  ----- Message from Shanice sent at 2/14/2025 11:11 AM CST -----  Regarding: message for Karen  Who Called: Krupa James    Preferred Method of Contact: Phone Call  Patient's Preferred Phone Number on File: [phone]     Additional Information:  Mrs. Gentile is calling to speak with Karen about upcoming surgery on 02/19/2025. She has some questions that she needs answered.

## 2025-02-18 ENCOUNTER — HOSPITAL ENCOUNTER (OUTPATIENT)
Dept: RADIOLOGY | Facility: HOSPITAL | Age: 75
Discharge: HOME | End: 2025-02-18
Attending: SURGERY
Payer: MEDICARE

## 2025-02-18 DIAGNOSIS — N63.0 BREAST MASS IN FEMALE: ICD-10-CM

## 2025-02-18 PROCEDURE — 78195 LYMPH SYSTEM IMAGING: CPT | Mod: TC

## 2025-02-18 PROCEDURE — A9541 TC99M SULFUR COLLOID: HCPCS | Performed by: SURGERY

## 2025-02-18 RX ORDER — TECHNETIUM TC 99M SULFUR COLLOID 2 MG
555 KIT MISCELLANEOUS
Status: COMPLETED | OUTPATIENT
Start: 2025-02-18 | End: 2025-02-18

## 2025-02-18 RX ADMIN — TECHNETIUM TC 99M SULFUR COLLOID KIT 0.56 MILLICURIE: KIT at 01:02

## 2025-02-18 NOTE — PROCEDURES
Lymphoscintography of right breast performed by Erasto CHAVARRIA supervised by Dr. Street. Patient prepped with chlorprep and draped in a sterile manner.  1% lidocaine utilized as a local anesthetic With a 25g needle 555uCi T99m of filtered sulphur colloid infused. 3 Injections given periaereolar at 12,4, and 8 oclock. Patient tolerated procedure well with no immediate complications and imaging was began.

## 2025-02-19 ENCOUNTER — ANESTHESIA (OUTPATIENT)
Dept: SURGERY | Facility: HOSPITAL | Age: 75
End: 2025-02-19
Payer: MEDICARE

## 2025-02-19 ENCOUNTER — ANESTHESIA EVENT (OUTPATIENT)
Dept: SURGERY | Facility: HOSPITAL | Age: 75
End: 2025-02-19
Payer: MEDICARE

## 2025-02-19 ENCOUNTER — HOSPITAL ENCOUNTER (OUTPATIENT)
Facility: HOSPITAL | Age: 75
Discharge: HOME | End: 2025-02-19
Attending: SURGERY | Admitting: SURGERY
Payer: MEDICARE

## 2025-02-19 VITALS
TEMPERATURE: 98 F | OXYGEN SATURATION: 95 % | HEIGHT: 65 IN | BODY MASS INDEX: 25.83 KG/M2 | HEART RATE: 72 BPM | RESPIRATION RATE: 16 BRPM | SYSTOLIC BLOOD PRESSURE: 128 MMHG | DIASTOLIC BLOOD PRESSURE: 68 MMHG | WEIGHT: 155 LBS

## 2025-02-19 DIAGNOSIS — C50.911 INVASIVE DUCTAL CARCINOMA OF BREAST, FEMALE, RIGHT: Primary | ICD-10-CM

## 2025-02-19 DIAGNOSIS — N63.0 BREAST MASS IN FEMALE: ICD-10-CM

## 2025-02-19 LAB
ABORH RETYPE: NORMAL
BASOPHILS # BLD AUTO: 0.12 K/UL (ref 0–0.2)
BASOPHILS NFR BLD AUTO: 1.6 % (ref 0–1)
DIFFERENTIAL METHOD BLD: ABNORMAL
EOSINOPHIL # BLD AUTO: 0.73 K/UL (ref 0–0.5)
EOSINOPHIL NFR BLD AUTO: 9.9 % (ref 1–4)
ERYTHROCYTE [DISTWIDTH] IN BLOOD BY AUTOMATED COUNT: 12.1 % (ref 11.5–14.5)
HCT VFR BLD AUTO: 43.2 % (ref 38–47)
HGB BLD-MCNC: 14 G/DL (ref 12–16)
IMM GRANULOCYTES # BLD AUTO: 0.02 K/UL (ref 0–0.04)
IMM GRANULOCYTES NFR BLD: 0.3 % (ref 0–0.4)
INDIRECT COOMBS: NORMAL
LYMPHOCYTES # BLD AUTO: 2.48 K/UL (ref 1–4.8)
LYMPHOCYTES NFR BLD AUTO: 33.7 % (ref 27–41)
MCH RBC QN AUTO: 30.2 PG (ref 27–31)
MCHC RBC AUTO-ENTMCNC: 32.4 G/DL (ref 32–36)
MCV RBC AUTO: 93.1 FL (ref 80–96)
MONOCYTES # BLD AUTO: 0.42 K/UL (ref 0–0.8)
MONOCYTES NFR BLD AUTO: 5.7 % (ref 2–6)
MPC BLD CALC-MCNC: 11.5 FL (ref 9.4–12.4)
NEUTROPHILS # BLD AUTO: 3.59 K/UL (ref 1.8–7.7)
NEUTROPHILS NFR BLD AUTO: 48.8 % (ref 53–65)
NRBC # BLD AUTO: 0 X10E3/UL
NRBC, AUTO (.00): 0 %
PLATELET # BLD AUTO: 247 K/UL (ref 150–400)
RBC # BLD AUTO: 4.64 M/UL (ref 4.2–5.4)
RH BLD: NORMAL
SPECIMEN OUTDATE: NORMAL
WBC # BLD AUTO: 7.36 K/UL (ref 4.5–11)

## 2025-02-19 PROCEDURE — 86900 BLOOD TYPING SEROLOGIC ABO: CPT | Performed by: SURGERY

## 2025-02-19 PROCEDURE — D9220A PRA ANESTHESIA: Mod: ANES,,, | Performed by: ANESTHESIOLOGY

## 2025-02-19 PROCEDURE — 27000510 HC BLANKET BAIR HUGGER ANY SIZE: Performed by: ANESTHESIOLOGY

## 2025-02-19 PROCEDURE — 88307 TISSUE EXAM BY PATHOLOGIST: CPT | Mod: 26,59,, | Performed by: PATHOLOGY

## 2025-02-19 PROCEDURE — 88342 IMHCHEM/IMCYTCHM 1ST ANTB: CPT | Mod: 26,XU,, | Performed by: PATHOLOGY

## 2025-02-19 PROCEDURE — 27201423 OPTIME MED/SURG SUP & DEVICES STERILE SUPPLY: Performed by: SURGERY

## 2025-02-19 PROCEDURE — C1819 TISSUE LOCALIZATION-EXCISION: HCPCS | Performed by: SURGERY

## 2025-02-19 PROCEDURE — 63600175 PHARM REV CODE 636 W HCPCS: Performed by: ANESTHESIOLOGY

## 2025-02-19 PROCEDURE — 88331 PATH CONSLTJ SURG 1 BLK 1SPC: CPT | Mod: 26,,, | Performed by: PATHOLOGY

## 2025-02-19 PROCEDURE — 88377 M/PHMTRC ALYS ISHQUANT/SEMIQ: CPT | Mod: 26,,, | Performed by: PATHOLOGY

## 2025-02-19 PROCEDURE — 37000008 HC ANESTHESIA 1ST 15 MINUTES: Performed by: SURGERY

## 2025-02-19 PROCEDURE — 27000716 HC OXISENSOR PROBE, ANY SIZE: Performed by: ANESTHESIOLOGY

## 2025-02-19 PROCEDURE — 88360 TUMOR IMMUNOHISTOCHEM/MANUAL: CPT | Mod: 26,59,, | Performed by: PATHOLOGY

## 2025-02-19 PROCEDURE — 63600175 PHARM REV CODE 636 W HCPCS: Performed by: SURGERY

## 2025-02-19 PROCEDURE — 38900 IO MAP OF SENT LYMPH NODE: CPT | Mod: RT,,, | Performed by: SURGERY

## 2025-02-19 PROCEDURE — C1729 CATH, DRAINAGE: HCPCS | Performed by: SURGERY

## 2025-02-19 PROCEDURE — 85025 COMPLETE CBC W/AUTO DIFF WBC: CPT | Performed by: SURGERY

## 2025-02-19 PROCEDURE — 71000015 HC POSTOP RECOV 1ST HR: Performed by: SURGERY

## 2025-02-19 PROCEDURE — 38525 BIOPSY/REMOVAL LYMPH NODES: CPT | Mod: 51,RT,, | Performed by: SURGERY

## 2025-02-19 PROCEDURE — 88341 IMHCHEM/IMCYTCHM EA ADD ANTB: CPT | Mod: 26,XU,, | Performed by: PATHOLOGY

## 2025-02-19 PROCEDURE — D9220A PRA ANESTHESIA: Mod: CRNA,,, | Performed by: ANESTHESIOLOGY

## 2025-02-19 PROCEDURE — 88307 TISSUE EXAM BY PATHOLOGIST: CPT | Mod: TC,91,SUR | Performed by: SURGERY

## 2025-02-19 PROCEDURE — 71000033 HC RECOVERY, INTIAL HOUR: Performed by: SURGERY

## 2025-02-19 PROCEDURE — 36415 COLL VENOUS BLD VENIPUNCTURE: CPT | Performed by: SURGERY

## 2025-02-19 PROCEDURE — 25000003 PHARM REV CODE 250: Performed by: SURGERY

## 2025-02-19 PROCEDURE — 27000177 HC AIRWAY, LARYNGEAL MASK: Performed by: ANESTHESIOLOGY

## 2025-02-19 PROCEDURE — 37000009 HC ANESTHESIA EA ADD 15 MINS: Performed by: SURGERY

## 2025-02-19 PROCEDURE — 36000707: Performed by: SURGERY

## 2025-02-19 PROCEDURE — 71000016 HC POSTOP RECOV ADDL HR: Performed by: SURGERY

## 2025-02-19 PROCEDURE — 19301 PARTIAL MASTECTOMY: CPT | Mod: RT,,, | Performed by: SURGERY

## 2025-02-19 PROCEDURE — 36000706: Performed by: SURGERY

## 2025-02-19 PROCEDURE — 36415 COLL VENOUS BLD VENIPUNCTURE: CPT | Mod: 91 | Performed by: SURGERY

## 2025-02-19 RX ORDER — MORPHINE SULFATE 8 MG/ML
INJECTION INTRAMUSCULAR; INTRAVENOUS; SUBCUTANEOUS
Status: DISCONTINUED | OUTPATIENT
Start: 2025-02-19 | End: 2025-02-19

## 2025-02-19 RX ORDER — MIDAZOLAM HYDROCHLORIDE 1 MG/ML
INJECTION INTRAMUSCULAR; INTRAVENOUS
Status: DISCONTINUED | OUTPATIENT
Start: 2025-02-19 | End: 2025-02-19

## 2025-02-19 RX ORDER — MEPERIDINE HYDROCHLORIDE 25 MG/ML
25 INJECTION INTRAMUSCULAR; INTRAVENOUS; SUBCUTANEOUS EVERY 10 MIN PRN
Status: DISCONTINUED | OUTPATIENT
Start: 2025-02-19 | End: 2025-02-19 | Stop reason: HOSPADM

## 2025-02-19 RX ORDER — FENTANYL CITRATE 50 UG/ML
INJECTION, SOLUTION INTRAMUSCULAR; INTRAVENOUS
Status: DISCONTINUED | OUTPATIENT
Start: 2025-02-19 | End: 2025-02-19

## 2025-02-19 RX ORDER — ONDANSETRON HYDROCHLORIDE 2 MG/ML
4 INJECTION, SOLUTION INTRAVENOUS DAILY PRN
Status: DISCONTINUED | OUTPATIENT
Start: 2025-02-19 | End: 2025-02-19 | Stop reason: HOSPADM

## 2025-02-19 RX ORDER — HYDROMORPHONE HYDROCHLORIDE 2 MG/ML
0.5 INJECTION, SOLUTION INTRAMUSCULAR; INTRAVENOUS; SUBCUTANEOUS EVERY 5 MIN PRN
Status: DISCONTINUED | OUTPATIENT
Start: 2025-02-19 | End: 2025-02-19 | Stop reason: HOSPADM

## 2025-02-19 RX ORDER — GLUCAGON 1 MG
1 KIT INJECTION
Status: DISCONTINUED | OUTPATIENT
Start: 2025-02-19 | End: 2025-02-19 | Stop reason: HOSPADM

## 2025-02-19 RX ORDER — HYDROCODONE BITARTRATE AND ACETAMINOPHEN 10; 325 MG/1; MG/1
1 TABLET ORAL EVERY 4 HOURS PRN
Refills: 0 | Status: DISCONTINUED | OUTPATIENT
Start: 2025-02-19 | End: 2025-02-19 | Stop reason: HOSPADM

## 2025-02-19 RX ORDER — BUPIVACAINE HYDROCHLORIDE 2.5 MG/ML
INJECTION, SOLUTION EPIDURAL; INFILTRATION; INTRACAUDAL
Status: DISCONTINUED | OUTPATIENT
Start: 2025-02-19 | End: 2025-02-19 | Stop reason: HOSPADM

## 2025-02-19 RX ORDER — ONDANSETRON 4 MG/1
8 TABLET, ORALLY DISINTEGRATING ORAL EVERY 8 HOURS PRN
Status: DISCONTINUED | OUTPATIENT
Start: 2025-02-19 | End: 2025-02-19 | Stop reason: HOSPADM

## 2025-02-19 RX ORDER — DIPHENHYDRAMINE HYDROCHLORIDE 50 MG/ML
25 INJECTION INTRAMUSCULAR; INTRAVENOUS EVERY 6 HOURS PRN
Status: DISCONTINUED | OUTPATIENT
Start: 2025-02-19 | End: 2025-02-19 | Stop reason: HOSPADM

## 2025-02-19 RX ORDER — MORPHINE SULFATE 10 MG/ML
4 INJECTION INTRAMUSCULAR; INTRAVENOUS; SUBCUTANEOUS EVERY 5 MIN PRN
Status: DISCONTINUED | OUTPATIENT
Start: 2025-02-19 | End: 2025-02-19 | Stop reason: HOSPADM

## 2025-02-19 RX ORDER — HYDROCODONE BITARTRATE AND ACETAMINOPHEN 7.5; 325 MG/1; MG/1
1 TABLET ORAL EVERY 6 HOURS PRN
Qty: 24 TABLET | Refills: 0 | Status: SHIPPED | OUTPATIENT
Start: 2025-02-19

## 2025-02-19 RX ORDER — IBUPROFEN 600 MG/1
600 TABLET ORAL EVERY 6 HOURS PRN
Status: DISCONTINUED | OUTPATIENT
Start: 2025-02-19 | End: 2025-02-19 | Stop reason: HOSPADM

## 2025-02-19 RX ORDER — LIDOCAINE HYDROCHLORIDE 20 MG/ML
INJECTION, SOLUTION EPIDURAL; INFILTRATION; INTRACAUDAL; PERINEURAL
Status: DISCONTINUED | OUTPATIENT
Start: 2025-02-19 | End: 2025-02-19

## 2025-02-19 RX ORDER — SODIUM CHLORIDE 9 MG/ML
INJECTION, SOLUTION INTRAVENOUS CONTINUOUS
Status: DISCONTINUED | OUTPATIENT
Start: 2025-02-19 | End: 2025-02-19 | Stop reason: HOSPADM

## 2025-02-19 RX ORDER — LIDOCAINE HYDROCHLORIDE 10 MG/ML
1 INJECTION, SOLUTION EPIDURAL; INFILTRATION; INTRACAUDAL; PERINEURAL ONCE AS NEEDED
Status: DISCONTINUED | OUTPATIENT
Start: 2025-02-19 | End: 2025-02-19 | Stop reason: HOSPADM

## 2025-02-19 RX ORDER — ONDANSETRON HYDROCHLORIDE 2 MG/ML
INJECTION, SOLUTION INTRAVENOUS
Status: DISCONTINUED | OUTPATIENT
Start: 2025-02-19 | End: 2025-02-19

## 2025-02-19 RX ORDER — PROPOFOL 10 MG/ML
VIAL (ML) INTRAVENOUS
Status: DISCONTINUED | OUTPATIENT
Start: 2025-02-19 | End: 2025-02-19

## 2025-02-19 RX ORDER — PHENYLEPHRINE HYDROCHLORIDE 10 MG/ML
INJECTION INTRAVENOUS
Status: DISCONTINUED | OUTPATIENT
Start: 2025-02-19 | End: 2025-02-19

## 2025-02-19 RX ORDER — MORPHINE SULFATE 10 MG/ML
4 INJECTION INTRAMUSCULAR; INTRAVENOUS; SUBCUTANEOUS ONCE
Refills: 0 | Status: DISCONTINUED | OUTPATIENT
Start: 2025-02-19 | End: 2025-02-19 | Stop reason: HOSPADM

## 2025-02-19 RX ORDER — CEFAZOLIN SODIUM 1 G/3ML
INJECTION, POWDER, FOR SOLUTION INTRAMUSCULAR; INTRAVENOUS
Status: DISCONTINUED | OUTPATIENT
Start: 2025-02-19 | End: 2025-02-19

## 2025-02-19 RX ORDER — METHYLENE BLUE 5 MG/ML
INJECTION INTRAVENOUS
Status: DISCONTINUED | OUTPATIENT
Start: 2025-02-19 | End: 2025-02-19 | Stop reason: HOSPADM

## 2025-02-19 RX ADMIN — PROPOFOL 100 MG: 10 INJECTION, EMULSION INTRAVENOUS at 08:02

## 2025-02-19 RX ADMIN — FENTANYL CITRATE 50 MCG: 50 INJECTION, SOLUTION INTRAMUSCULAR; INTRAVENOUS at 09:02

## 2025-02-19 RX ADMIN — PROPOFOL 50 MG: 10 INJECTION, EMULSION INTRAVENOUS at 08:02

## 2025-02-19 RX ADMIN — CEFAZOLIN 2000 MG: 1 INJECTION, POWDER, FOR SOLUTION INTRAMUSCULAR; INTRAVENOUS; PARENTERAL at 08:02

## 2025-02-19 RX ADMIN — MIDAZOLAM HYDROCHLORIDE 1 MG: 1 INJECTION, SOLUTION INTRAMUSCULAR; INTRAVENOUS at 08:02

## 2025-02-19 RX ADMIN — MORPHINE SULFATE 2 MG: 8 INJECTION INTRAVENOUS at 10:02

## 2025-02-19 RX ADMIN — SODIUM CHLORIDE: 9 INJECTION, SOLUTION INTRAVENOUS at 07:02

## 2025-02-19 RX ADMIN — FENTANYL CITRATE 50 MCG: 50 INJECTION, SOLUTION INTRAMUSCULAR; INTRAVENOUS at 08:02

## 2025-02-19 RX ADMIN — ONDANSETRON 4 MG: 2 INJECTION INTRAMUSCULAR; INTRAVENOUS at 08:02

## 2025-02-19 RX ADMIN — MORPHINE SULFATE 2 MG: 8 INJECTION INTRAVENOUS at 09:02

## 2025-02-19 RX ADMIN — LIDOCAINE HYDROCHLORIDE 50 MG: 20 INJECTION, SOLUTION EPIDURAL; INFILTRATION; INTRACAUDAL; PERINEURAL at 08:02

## 2025-02-19 RX ADMIN — PHENYLEPHRINE HYDROCHLORIDE 100 MCG: 10 INJECTION INTRAVENOUS at 08:02

## 2025-02-19 NOTE — OP NOTE
[facility]     Operative Note    SUMMARY     Date of Procedure: 2/19/2025     Procedure: Procedure(s) (LRB):  BIOPSY, LYMPH NODE, SENTINEL (Right)  EXCISION, MASS, BREAST (Right)     Surgeons and Role:     * Gary Abdi MD - Primary    Assisting Surgeon: None    Pre-Operative Diagnosis: Breast mass in female [N63.0]    Post-Operative Diagnosis: Post-Op Diagnosis Codes:     Invasive ductal carcinoma of the right breast    Anesthesia: General    Technical Procedures Used:  The patient was brought to the operating room and placed in supine position.  General anesthesia was administered.  The right breast was prepped and draped standard sterile fashion, to include the axilla.  An ellipse of skin was then excised overlying the palpable mass in the inferior aspect of the right breast.  Dissection was carried circumferentially around the palpable mass using sharp dissection with scissors, as well as cautery, trying to obtain the least 0.5 cm to 1 cm margin.  The mass extended very near to the chest wall.  Hemostasis was achieved with cautery.  Specimen was then sent for permanent evaluation, was marking the cephalad margin with a suture subsequent to this we irrigated with sterile water.  The peritumoral soft tissue was injected with methylene blue as well as the inferior lateral aspect of the areolar margin.  Decision was then made to obtain additional cephalad and caudad margins.  This was performed using cautery and specimens were marked with a comment that the surface was facing the tumor cavity.  Clips were placed circumferentially around the cavity to assist radiation oncology.  The wound was then closed using interrupted Vicryl followed by continuous running Vicryl.      After closure of the wound, the pathologist reported the diagnosis of invasive carcinoma and stated that the cephalad margin deep margins were 2 mm.  We proceeded with the sentinel node biopsy with a plan to  re-excise the margins following the summer node dissection.    An incision was then performed overlying the right axilla.  Patrick node biopsy was then performed using the [place] counter to determine the area of highest count. A blue lymphatic was not seen, but a blue left thigh was identified using the [place] counter.  Additionally, 2 more sentinel nodes were identified, with the 3rd node identified having higher counts the 2nd and also having a small amount of blue dye.  After excising the 3rd sentinel node, there were no further radioactive nodes identifiable within the axilla.  Clips were placed where necessary for control of the vessels and lymphatics. Nodes were sent to pathology as sentinel nodes in order that there were removed.  The right axilla was irrigated with sterile water prior to drain placement.  A STEVEN drain was directed through a separate stab incision into the bed of the wound.  Drain was secured to the skin with a silk suture.  Wound closure was then performed using interrupted Vicryl suture followed by continuous running 4-0 Vicryl.    At this point, decision was made to reopen the breast biopsy cavity obtain additional deep margins.  These margins were obtained from the lateral aspect of the and medial aspect of the biopsy cavity.  The a with a present on the peritoneum all side, so no suture orientation was necessary following this sterile water irrigation was performed and wound was closed again with interrupted 3-0 Vicryl followed by 4-0 Vicryl were placed and sterile dressings were applied.  The patient was awakened from anesthesia in stable condition.    Description of the Findings of the Procedure:  After excision of the right breast mass, frozen section was performed revealing invasive adenocarcinoma.  Two of the margins were close, with a 1 of these to include the deep margin had 2 mm.  Therefore a the medial and the lateral margin was obtained following the performance of the sentinel  node biopsy.  Three sentinel nodes were identified, with 1 and 3 having the highest counts.  The 1st node was also blue and the 3rd node was partially blue stained.  Counts of the sentinel nodes were roughly 820 for 1, 250 for second node and 350 for the third node.  Baseline counts were in the 3400 range for the injection site.  After performing the sentinel node dissection, we obtained the additional deep margins.    Assistant(s): None    Complications: No    Estimated Blood Loss (EBL):  25cc           Implants: * No implants in log *    Specimens  1. right breast mass,   2. inferior margin,   3. superior margin,   4.- 6.  sentinel nodes(3),   7.  deep/lateral margin,   8.  deep medial margin  Specimen (24h ago, onward)       Start     Ordered    02/19/25 0857  Surgical Pathology  RELEASE UPON ORDERING         02/19/25 0857                     Condition: Good      Complications:  None

## 2025-02-19 NOTE — ANESTHESIA PROCEDURE NOTES
Intubation    Date/Time: 2/19/2025 8:34 AM    Performed by: Lelo Ruiz CRNA  Authorized by: Romeo Coats MD    Intubation:     Induction:  Intravenous    Intubated:  Postinduction    Mask Ventilation:  Easy mask    Attempts:  1    Attempted By:  CRNA    Difficult Airway Encountered?: No      Complications:  None    Airway Device:  Supraglottic airway/LMA    Airway Device Size:  4.0    Style/Cuff Inflation:  Cuffed (inflated to minimal occlusive pressure)    Placement Verified By:  Capnometry    Complicating Factors:  None    Findings Post-Intubation:  BS equal bilateral and atraumatic/condition of teeth unchanged

## 2025-02-19 NOTE — TRANSFER OF CARE
"Anesthesia Transfer of Care Note    Patient: Krupa James    Procedure(s) Performed: Procedure(s) (LRB):  BIOPSY, LYMPH NODE, SENTINEL (Right)  EXCISION, MASS, BREAST (Right)    Patient location: PACU    Anesthesia Type: general    Transport from OR: Transported from OR on room air with adequate spontaneous ventilation    Post pain: adequate analgesia    Post assessment: no apparent anesthetic complications and tolerated procedure well    Post vital signs: stable    Level of consciousness: responds to stimulation    Nausea/Vomiting: no nausea/vomiting    Complications: none    Transfer of care protocol was followedComments: Report Given to PACU rn VSS      Last vitals: Visit Vitals  /69 (Patient Position: Lying)   Pulse 66   Temp 36.6 °C (97.8 °F) (Oral)   Resp 16   Ht 5' 5" (1.651 m)   Wt 70.3 kg (155 lb)   SpO2 96%   Breastfeeding No   BMI 25.79 kg/m²     "

## 2025-02-19 NOTE — ANESTHESIA PREPROCEDURE EVALUATION
02/19/2025  Krupa James is a 74 y.o., female.      Pre-op Assessment    I have reviewed the Patient Summary Reports.    I have reviewed the NPO Status.   I have reviewed the Medications.     Review of Systems         Anesthesia Plan  Type of Anesthesia, risks & benefits discussed:    Anesthesia Type: Gen Supraglottic Airway  Intra-op Monitoring Plan: Standard ASA Monitors  Post Op Pain Control Plan: IV/PO Opioids PRN  Induction:  IV  Informed Consent: Informed consent signed with the Patient and all parties understand the risks and agree with anesthesia plan.  All questions answered.   ASA Score: 3    Ready For Surgery From Anesthesia Perspective.     .  NPO greater than 8 hours  No anesthetic complications  Allergies      Nickel Drug Ingredient, Environmental Hives High  2/4/2025      Iodine And Iodide Containing Products Drug Class  Not Specified  6/29/2021      Latex, Natural Rubber          Hct 45  2/4/25 EKG: Normal sinus rhythm; 70 bpm   Nonspecific ST abnormality     H/o CVA  Mitral valve prolapse  Anxiety    MP II; adequate ROM at neck

## 2025-02-21 LAB
DHEA SERPL-MCNC: NORMAL
ESTROGEN SERPL-MCNC: NORMAL PG/ML
INSULIN SERPL-ACNC: NORMAL U[IU]/ML
LAB AP GROSS DESCRIPTION: NORMAL
LAB AP LABORATORY NOTES: NORMAL
LAB AP PREDICTIVE MARKER TESTING: NORMAL
LAB AP SYNOPTIC CHECKLIST: NORMAL
T3RU NFR SERPL: NORMAL %

## 2025-02-24 ENCOUNTER — OFFICE VISIT (OUTPATIENT)
Dept: SURGERY | Facility: CLINIC | Age: 75
End: 2025-02-24
Attending: SURGERY
Payer: MEDICARE

## 2025-02-24 VITALS
HEIGHT: 65 IN | HEART RATE: 78 BPM | SYSTOLIC BLOOD PRESSURE: 145 MMHG | BODY MASS INDEX: 25.79 KG/M2 | OXYGEN SATURATION: 96 % | RESPIRATION RATE: 18 BRPM | DIASTOLIC BLOOD PRESSURE: 88 MMHG

## 2025-02-24 DIAGNOSIS — Z17.0 MALIGNANT NEOPLASM OF RIGHT BREAST IN FEMALE, ESTROGEN RECEPTOR POSITIVE, UNSPECIFIED SITE OF BREAST: Primary | ICD-10-CM

## 2025-02-24 DIAGNOSIS — C50.911 MALIGNANT NEOPLASM OF RIGHT BREAST IN FEMALE, ESTROGEN RECEPTOR POSITIVE, UNSPECIFIED SITE OF BREAST: Primary | ICD-10-CM

## 2025-02-24 DIAGNOSIS — Z09 POSTOP CHECK: ICD-10-CM

## 2025-02-24 PROCEDURE — 99024 POSTOP FOLLOW-UP VISIT: CPT | Mod: ,,, | Performed by: SURGERY

## 2025-02-24 PROCEDURE — 99214 OFFICE O/P EST MOD 30 MIN: CPT | Mod: PBBFAC | Performed by: SURGERY

## 2025-02-24 PROCEDURE — 99999 PR PBB SHADOW E&M-EST. PATIENT-LVL IV: CPT | Mod: PBBFAC,,, | Performed by: SURGERY

## 2025-02-24 NOTE — DISCHARGE SUMMARY
[facility]  Discharge Note  Short Stay    Procedure(s) (LRB):  BIOPSY, LYMPH NODE, SENTINEL (Right)  EXCISION, MASS, BREAST (Right)      OUTCOME: Patient tolerated treatment/procedure well without complication and is now ready for discharge.    DISPOSITION: Home or Self Care    FINAL DIAGNOSIS:  Breast mass in female    FOLLOWUP: In clinic    DISCHARGE INSTRUCTIONS:    Discharge Procedure Orders   Diet general     Remove dressing in 48 hours   Order Comments: Leave steristrips on     Call MD for:  temperature >100.4     Call MD for:  persistent nausea and vomiting     Call MD for:  severe uncontrolled pain     Call MD for:  difficulty breathing, headache or visual disturbances     Wound care routine (specify)   Order Comments: The patient had a manage and drain (empty, record output, and recharge).  Perform 2-3 times per day.  Bring the record of outputs to the clinic visit.     Shower on day dressing removed (No bath)         Clinical Reference Documents Added to Patient Instructions         Document    BREAST BIOPSY DISCHARGE INSTRUCTIONS (ENGLISH)    GIOVANY-JOSÉ DRAIN (ENGLISH)            TIME SPENT ON DISCHARGE: 20 minutes

## 2025-02-25 PROBLEM — Z09 POSTOP CHECK: Status: ACTIVE | Noted: 2025-02-25

## 2025-02-25 NOTE — ANESTHESIA POSTPROCEDURE EVALUATION
Anesthesia Post Evaluation    Patient: Krupa James    Procedure(s) Performed: Procedure(s) (LRB):  BIOPSY, LYMPH NODE, SENTINEL (Right)  EXCISION, MASS, BREAST (Right)    Final Anesthesia Type: general      Patient location during evaluation: PACU  Post-procedure vital signs: reviewed and stable  Pain management: adequate  Airway patency: patent    PONV status at discharge: No PONV  Anesthetic complications: no      Cardiovascular status: hemodynamically stable  Respiratory status: unassisted  Hydration status: euvolemic  Follow-up not needed.              Vitals Value Taken Time   /68 02/19/25 14:16   Temp 36.6 °C (97.8 °F) 02/19/25 11:07   Pulse 71 02/19/25 14:19   Resp 16 02/19/25 13:30   SpO2 94 % 02/19/25 14:19   Vitals shown include unfiled device data.      Event Time   Out of Recovery 11:44:57         Pain/Wilfrido Score: No data recorded

## 2025-02-25 NOTE — PROGRESS NOTES
Status post excision of right breast mass and sentinel node biopsy.  Patient was confirmed to have cancer in the right breast.  Margins were good following re-excision.  The patient reports less than 10 cc of drainage per 24 hours  Incisions are healing nicely.    Drain was removed in clinic.    Discussed referral to Radiation Oncology.    Follow up with me in 1-2 weeks for wound check.

## 2025-02-26 ENCOUNTER — HOSPITAL ENCOUNTER (OUTPATIENT)
Dept: CARDIOLOGY | Facility: HOSPITAL | Age: 75
Discharge: HOME | End: 2025-02-26
Attending: HOSPITALIST
Payer: MEDICARE

## 2025-02-26 DIAGNOSIS — I34.1 MITRAL VALVE PROLAPSE: ICD-10-CM

## 2025-02-26 PROCEDURE — 93306 TTE W/DOPPLER COMPLETE: CPT

## 2025-02-26 PROCEDURE — 93306 TTE W/DOPPLER COMPLETE: CPT | Mod: 26,,, | Performed by: HOSPITALIST

## 2025-02-27 LAB
AORTIC ROOT ANNULUS: 2.63 CM
AORTIC VALVE CUSP SEPERATION: 1.49 CM
APICAL FOUR CHAMBER EJECTION FRACTION: 59 %
APICAL TWO CHAMBER EJECTION FRACTION: 64 %
ASCENDING AORTA: 2.82 CM
AV INDEX (PROSTH): 0.92
AV MEAN GRADIENT: 3 MMHG
AV PEAK GRADIENT: 6 MMHG
AV VALVE AREA BY VELOCITY RATIO: 2.9 CM²
AV VALVE AREA: 2.9 CM²
AV VELOCITY RATIO: 0.92
CV ECHO LV RWT: 0.38 CM
DOP CALC AO PEAK VEL: 1.2 M/S
DOP CALC AO VTI: 23.2 CM
DOP CALC LVOT AREA: 3.1 CM2
DOP CALC LVOT DIAMETER: 2 CM
DOP CALC LVOT PEAK VEL: 1.1 M/S
DOP CALC LVOT STROKE VOLUME: 66.9 CM3
DOP CALC MV VTI: 25 CM
DOP CALC RVOT PEAK VEL: 0.55 M/S
DOP CALC RVOT VTI: 12.2 CM
DOP CALCLVOT PEAK VEL VTI: 21.3 CM
E WAVE DECELERATION TIME: 197 MSEC
E/A RATIO: 0.72
E/E' RATIO: 5 M/S
ECHO LV POSTERIOR WALL: 0.7 CM (ref 0.6–1.1)
FRACTIONAL SHORTENING: 21.6 % (ref 28–44)
INTERVENTRICULAR SEPTUM: 0.7 CM (ref 0.6–1.1)
IVC DIAMETER: 1.22 CM
IVRT: 65 MSEC
LEFT ATRIUM AREA SYSTOLIC (APICAL 2 CHAMBER): 12.86 CM2
LEFT ATRIUM AREA SYSTOLIC (APICAL 4 CHAMBER): 10.1 CM2
LEFT ATRIUM SIZE: 2.5 CM
LEFT ATRIUM VOLUME MOD: 23 ML
LEFT INTERNAL DIMENSION IN SYSTOLE: 2.9 CM (ref 2.1–4)
LEFT VENTRICLE DIASTOLIC VOLUME: 57 ML
LEFT VENTRICLE END DIASTOLIC VOLUME APICAL 2 CHAMBER: 25.6 ML
LEFT VENTRICLE END DIASTOLIC VOLUME APICAL 4 CHAMBER: 56.17 ML
LEFT VENTRICLE END SYSTOLIC VOLUME APICAL 2 CHAMBER: 29.4 ML
LEFT VENTRICLE END SYSTOLIC VOLUME APICAL 4 CHAMBER: 19.1 ML
LEFT VENTRICLE SYSTOLIC VOLUME: 31 ML
LEFT VENTRICULAR INTERNAL DIMENSION IN DIASTOLE: 3.7 CM (ref 3.5–6)
LEFT VENTRICULAR MASS: 68.8 G
LV LATERAL E/E' RATIO: 5.1 M/S
LV SEPTAL E/E' RATIO: 5.1 M/S
LVED V (TEICH): 56.81 ML
LVES V (TEICH): 31.18 ML
LVOT MG: 2.25 MMHG
LVOT MV: 0.68 CM/S
MV MEAN GRADIENT: 1 MMHG
MV PEAK A VEL: 0.57 M/S
MV PEAK E VEL: 0.41 M/S
MV PEAK GRADIENT: 2 MMHG
MV STENOSIS PRESSURE HALF TIME: 46.46 MS
MV VALVE AREA BY CONTINUITY EQUATION: 2.68 CM2
MV VALVE AREA P 1/2 METHOD: 4.74 CM2
OHS CV RV/LV RATIO: 0.73 CM
OHS LV EJECTION FRACTION SIMPSONS BIPLANE MOD: 62 %
PISA TR MAX VEL: 2.3 M/S
PV MEAN GRADIENT: 1 MMHG
PV MV: 0.61 M/S
PV PEAK GRADIENT: 2 MMHG
PV PEAK VELOCITY: 0.74 M/S
RA VOL SYS: 23.32 ML
RIGHT ATRIAL AREA: 10.6 CM2
RIGHT ATRIUM VOLUME AREA LENGTH APICAL 4 CHAMBER: 21.7 ML
RIGHT VENTRICLE DIASTOLIC BASEL DIMENSION: 2.7 CM
RIGHT VENTRICLE DIASTOLIC LENGTH: 5.7 CM
RIGHT VENTRICLE DIASTOLIC MID DIMENSION: 1.7 CM
RIGHT VENTRICULAR END-DIASTOLIC DIMENSION: 2.7 CM
RIGHT VENTRICULAR LENGTH IN DIASTOLE (APICAL 4-CHAMBER VIEW): 5.67 CM
RV MID DIAMA: 1.65 CM
STJ: 2.75 CM
TDI LATERAL: 0.08 M/S
TDI SEPTAL: 0.08 M/S
TDI: 0.08 M/S
TR MAX PG: 21 MMHG

## 2025-03-03 ENCOUNTER — OFFICE VISIT (OUTPATIENT)
Dept: SURGERY | Facility: CLINIC | Age: 75
End: 2025-03-03
Attending: SURGERY
Payer: MEDICARE

## 2025-03-03 VITALS — BODY MASS INDEX: 25.79 KG/M2 | HEIGHT: 65 IN

## 2025-03-03 DIAGNOSIS — Z09 POSTOP CHECK: Primary | ICD-10-CM

## 2025-03-03 PROCEDURE — 99999 PR PBB SHADOW E&M-EST. PATIENT-LVL III: CPT | Mod: PBBFAC,,, | Performed by: SURGERY

## 2025-03-03 PROCEDURE — 99024 POSTOP FOLLOW-UP VISIT: CPT | Mod: ,,, | Performed by: SURGERY

## 2025-03-03 PROCEDURE — 99213 OFFICE O/P EST LOW 20 MIN: CPT | Mod: PBBFAC | Performed by: SURGERY

## 2025-03-05 NOTE — PROGRESS NOTES
Patient returns for wound check.      Incisions look good.  There are no significant fluid collections.    Patient preparing to see Radiation Oncology soon.    Return in 3 months for routine visit.

## 2025-03-16 DIAGNOSIS — J30.9 ALLERGIC RHINITIS, UNSPECIFIED SEASONALITY, UNSPECIFIED TRIGGER: Primary | ICD-10-CM

## 2025-03-17 DIAGNOSIS — J30.9 ALLERGIC RHINITIS, UNSPECIFIED SEASONALITY, UNSPECIFIED TRIGGER: Primary | ICD-10-CM

## 2025-03-17 NOTE — TELEPHONE ENCOUNTER
----- Message from Ana sent at 3/17/2025 11:23 AM CDT -----  Regarding: Rx refill  Who Called: Krupa JamesRefill or New Rx:RefillRX Name and Strength: ZYRTEC-D 5-120 mg Tb12 60 tablet 3 1/2/2024 - YesHow is the patient currently taking it? Take 1 tablet by mouth 2 (two) times daily as needed. - OralIs this a 30 day or 90 day RX: 30 daysLocal or Mail Order: LocalList of preferred pharmacies on file: [facility] [address] [place] [address] [address] [place] [id number]: [phone] Fax: [phone]: Not open 24 hoursPreferred Method of Contact: Phone CallPatient's Preferred Phone Number on File: [phone] Additional Information: Pt. Stated she is completely out and needs her Rx today. She would also like for the nurse to give her a courtesy call

## 2025-03-23 RX ORDER — CETIRIZINE HYDROCHLORIDE AND PSEUDOEPHEDRINE HYDROCHLORIDE 5; 120 MG/1; MG/1
1 TABLET, FILM COATED, EXTENDED RELEASE ORAL 2 TIMES DAILY PRN
Qty: 60 TABLET | Refills: 3 | OUTPATIENT
Start: 2025-03-23

## 2025-04-29 ENCOUNTER — OFFICE VISIT (OUTPATIENT)
Dept: FAMILY MEDICINE | Facility: CLINIC | Age: 75
End: 2025-04-29
Payer: MEDICARE

## 2025-04-29 VITALS
TEMPERATURE: 98 F | SYSTOLIC BLOOD PRESSURE: 114 MMHG | BODY MASS INDEX: 25.66 KG/M2 | OXYGEN SATURATION: 95 % | DIASTOLIC BLOOD PRESSURE: 78 MMHG | HEIGHT: 65 IN | HEART RATE: 74 BPM | RESPIRATION RATE: 20 BRPM | WEIGHT: 154 LBS

## 2025-04-29 DIAGNOSIS — E03.9 HYPOTHYROIDISM, UNSPECIFIED TYPE: Primary | ICD-10-CM

## 2025-04-29 DIAGNOSIS — C50.511 MALIGNANT NEOPLASM OF LOWER-OUTER QUADRANT OF RIGHT BREAST OF FEMALE, ESTROGEN RECEPTOR POSITIVE: ICD-10-CM

## 2025-04-29 DIAGNOSIS — Z17.0 MALIGNANT NEOPLASM OF LOWER-OUTER QUADRANT OF RIGHT BREAST OF FEMALE, ESTROGEN RECEPTOR POSITIVE: ICD-10-CM

## 2025-04-29 DIAGNOSIS — Z72.0 TOBACCO ABUSE: ICD-10-CM

## 2025-04-29 DIAGNOSIS — E78.5 HYPERLIPIDEMIA, UNSPECIFIED HYPERLIPIDEMIA TYPE: ICD-10-CM

## 2025-04-29 PROCEDURE — 99214 OFFICE O/P EST MOD 30 MIN: CPT | Mod: ,,,

## 2025-04-29 RX ORDER — LEVOTHYROXINE SODIUM 50 UG/1
50 TABLET ORAL
Qty: 90 TABLET | Refills: 3 | Status: SHIPPED | OUTPATIENT
Start: 2025-04-29

## 2025-04-29 RX ORDER — PRAVASTATIN SODIUM 40 MG/1
40 TABLET ORAL NIGHTLY
Qty: 90 TABLET | Refills: 3 | Status: SHIPPED | OUTPATIENT
Start: 2025-04-29

## 2025-04-29 NOTE — PROGRESS NOTES
Patricia Gibson NP   [address] [place] [address]     PATIENT NAME: Krupa James  : 1950  DATE: 25  MRN: [id number]      Billing Provider: Patricia Gibson NP  Level of Service:   Patient PCP Information       Provider PCP Type    Primary Doctor No General            Reason for Visit / Chief Complaint: Follow-up and Health Maintenance (TETANUS VACCINE Never done/Shingles Vaccine(1 of 2) Never done/Pneumococcal Vaccines (Age 50+)(1 of 2 - PCV) Never done/DEXA Scan Never done/Colorectal Cancer Screening Never done/RSV Vaccine (Age 60+ and Pregnant patients)(1 - Risk 60-74 years 1-dose series) Never done/COVID-19 Vaccine(1) due on 2022/)       Update PCP  Update Chief Complaint         History of Present Illness / Problem Focused Workflow     Krupa James presents to the clinic with Follow-up and Health Maintenance (TETANUS VACCINE Never done/Shingles Vaccine(1 of 2) Never done/Pneumococcal Vaccines (Age 50+)(1 of 2 - PCV) Never done/DEXA Scan Never done/Colorectal Cancer Screening Never done/RSV Vaccine (Age 60+ and Pregnant patients)(1 - Risk 60-74 years 1-dose series) Never done/COVID-19 Vaccine(1) due on 2022/)     Patient here today for follow up visit. She is followed by cardiology for MVP and is now on Plavix.   Hx of Invasive ductal carcinoma of the right breast she has completed 20 rounds of radiation. She does have right breast tenderness and erythema from radiation. She is followed by oncology. She does still smoke and is working on quitting. S Declines labs today. Hyperlipidemia managed with Pravastatin. Advised on low cholesterol and fat diet. Hypothyroidism managed with Levothyroxine. Anxiety managed with Librium. She reports no issues today. Refuses labs. Declines all vaccines. Return to clinic in 3 months and as needed.       Follow-up  Associated symptoms include a rash. Pertinent negatives include no abdominal pain, chest pain, coughing,  headaches, nausea or vomiting. Numbness: ..      Review of Systems     Review of Systems   Constitutional: Negative.    HENT: Negative.     Eyes: Negative.    Respiratory: Negative.  Negative for cough, shortness of breath and wheezing.    Cardiovascular: Negative.  Negative for chest pain, palpitations and leg swelling.   Gastrointestinal:  Negative for abdominal pain, blood in stool, nausea and vomiting.   Endocrine: Negative.    Genitourinary: Negative.  Negative for difficulty urinating, dysuria, flank pain, frequency and hematuria.   Musculoskeletal: Negative.    Integumentary:  Positive for rash and breast tenderness.   Allergic/Immunologic: Negative.    Neurological:  Negative for dizziness and headaches. Numbness: ..  Psychiatric/Behavioral: Negative.     Breast: Positive for tenderness.       Medical / Social / Family History     Past Medical History:   Diagnosis Date    Anxiety     Arthritis     Hyperlipidemia     Hypothyroidism     Mitral valve prolapse     Stroke        Past Surgical History:   Procedure Laterality Date    BREAST MASS EXCISION Right 2/19/2025    Procedure: EXCISION, MASS, BREAST;  Surgeon: Gary Abdi MD;  Location: [facility];  Service: General;  Laterality: Right;  excision biopsy right breast,possible mastectomy,sentinel node biopsy    CHOLECYSTECTOMY      SENTINEL LYMPH NODE BIOPSY Right 2/19/2025    Procedure: BIOPSY, LYMPH NODE, SENTINEL;  Surgeon: Gary Abdi MD;  Location: [facility];  Service: General;  Laterality: Right;       Social History  Ms.  reports that she has been smoking cigarettes. She has a 15 pack-year smoking history. She has been exposed to tobacco smoke. She has never used smokeless tobacco. She reports that she does not drink alcohol and does not use drugs.    Family History  Ms.'s family history includes Alzheimer's disease in her mother; Cancer in her father; Diabetes in her mother; Leukemia in her father; Meniere's disease in her mother;  "Parkinsonism in her brother; Stroke in her mother.    Medications and Allergies     Medications  No outpatient medications have been marked as taking for the 4/29/25 encounter (Office Visit) with Patricia Gibson NP.       Allergies  Review of patient's allergies indicates:   Allergen Reactions    Nickel Hives    Iodine and iodide containing products     Latex, natural rubber        Physical Examination   /78 (BP Location: Left arm, Patient Position: Sitting)   Pulse 74   Temp 98.2 °F (36.8 °C) (Oral)   Resp 20   Ht 5' 5" (1.651 m)   Wt 69.9 kg (154 lb)   SpO2 95%   BMI 25.63 kg/m²    Physical Exam  Vitals reviewed.   Constitutional:       Appearance: Normal appearance.   HENT:      Right Ear: Tympanic membrane, ear canal and external ear normal.      Left Ear: Tympanic membrane, ear canal and external ear normal.      Nose: Nose normal.      Mouth/Throat:      Mouth: Mucous membranes are moist.      Pharynx: Oropharynx is clear.   Eyes:      Extraocular Movements: Extraocular movements intact.      Conjunctiva/sclera: Conjunctivae normal.      Pupils: Pupils are equal, round, and reactive to light.   Cardiovascular:      Rate and Rhythm: Normal rate and regular rhythm.      Pulses: Normal pulses.      Heart sounds: Normal heart sounds.   Pulmonary:      Effort: Pulmonary effort is normal. No respiratory distress.      Breath sounds: Normal breath sounds. No wheezing or rales.   Abdominal:      General: Bowel sounds are normal.      Palpations: Abdomen is soft.      Tenderness: There is no abdominal tenderness. There is no right CVA tenderness, left CVA tenderness or guarding.   Musculoskeletal:         General: Normal range of motion.      Cervical back: Normal range of motion and neck supple.   Lymphadenopathy:      Cervical: No cervical adenopathy.   Skin:     General: Skin is warm and dry.      Capillary Refill: Capillary refill takes less than 2 seconds.   Neurological:      General: No focal " deficit present.      Mental Status: She is alert and oriented to person, place, and time.   Psychiatric:         Mood and Affect: Mood normal.         Behavior: Behavior normal.          Assessment and Plan (including Health Maintenance)      Problem List  Smart Sets  Document Outside HM   :    Plan:    Declines labs today.    Advised on low cholesterol and fat diet.   Declines all vaccines.   Return to clinic in 3 months and as needed.         Health Maintenance Due   Topic Date Due    TETANUS VACCINE  Never done    Shingles Vaccine (1 of 2) Never done    Pneumococcal Vaccines (Age 50+) (1 of 2 - PCV) Never done    DEXA Scan  Never done    Colorectal Cancer Screening  Never done    RSV Vaccine (Age 60+ and Pregnant patients) (1 - Risk 60-74 years 1-dose series) Never done    COVID-19 Vaccine (1) 01/09/2022       Problem List Items Addressed This Visit       Hyperlipidemia    Relevant Medications    pravastatin (PRAVACHOL) 40 MG tablet       Health Maintenance Topics with due status: Not Due       Topic Last Completion Date    Hemoglobin A1c (Prediabetes) 01/15/2025    Lipid Panel 01/15/2025    Mammogram 01/30/2025       Future Appointments   Date Time Provider Department Center   6/3/2025 10:30 AM [facility]   7/15/2025  2:00 PM AWV NURSE, [facility] DAMI Pichardo            Signature:  Patricia Gibson NP      [address] [place] [address]    Date of encounter: 4/29/25

## 2025-04-30 NOTE — PATIENT INSTRUCTIONS
Declines labs today.    Advised on low cholesterol and fat diet.   Declines all vaccines.   Return to clinic in 3 months and as needed.

## 2025-04-30 NOTE — TELEPHONE ENCOUNTER
Informed patient of time and date of lymphoscintigraphy. Patient verbalizes understanding and denies further questions at this time.    [[id number]]

## 2025-06-03 ENCOUNTER — OFFICE VISIT (OUTPATIENT)
Dept: SURGERY | Facility: CLINIC | Age: 75
End: 2025-06-03
Attending: SURGERY
Payer: MEDICARE

## 2025-06-03 VITALS — HEIGHT: 65 IN | BODY MASS INDEX: 25.63 KG/M2

## 2025-06-03 DIAGNOSIS — Z17.0 MALIGNANT NEOPLASM OF LOWER-OUTER QUADRANT OF RIGHT BREAST OF FEMALE, ESTROGEN RECEPTOR POSITIVE: ICD-10-CM

## 2025-06-03 DIAGNOSIS — C50.511 MALIGNANT NEOPLASM OF LOWER-OUTER QUADRANT OF RIGHT BREAST OF FEMALE, ESTROGEN RECEPTOR POSITIVE: ICD-10-CM

## 2025-06-03 DIAGNOSIS — Z09 POSTOP CHECK: Primary | ICD-10-CM

## 2025-06-03 DIAGNOSIS — C50.911 MALIGNANT NEOPLASM OF RIGHT BREAST IN FEMALE, ESTROGEN RECEPTOR POSITIVE, UNSPECIFIED SITE OF BREAST: ICD-10-CM

## 2025-06-03 DIAGNOSIS — Z17.0 MALIGNANT NEOPLASM OF RIGHT BREAST IN FEMALE, ESTROGEN RECEPTOR POSITIVE, UNSPECIFIED SITE OF BREAST: ICD-10-CM

## 2025-06-03 PROCEDURE — 99213 OFFICE O/P EST LOW 20 MIN: CPT | Mod: PBBFAC | Performed by: SURGERY

## 2025-06-03 PROCEDURE — 99999 PR PBB SHADOW E&M-EST. PATIENT-LVL III: CPT | Mod: PBBFAC,,, | Performed by: SURGERY

## 2025-06-13 ENCOUNTER — HOSPITAL ENCOUNTER (EMERGENCY)
Facility: HOSPITAL | Age: 75
Discharge: HOME | End: 2025-06-14
Payer: MEDICARE

## 2025-06-13 DIAGNOSIS — J40 BRONCHITIS: Primary | ICD-10-CM

## 2025-06-13 DIAGNOSIS — R06.02 SHORTNESS OF BREATH: ICD-10-CM

## 2025-06-13 LAB
ALBUMIN SERPL BCP-MCNC: 3.7 G/DL (ref 3.4–4.8)
ALBUMIN/GLOB SERPL: 1.1 {RATIO}
ALP SERPL-CCNC: 71 U/L (ref 40–150)
ALT SERPL W P-5'-P-CCNC: 15 U/L
ANION GAP SERPL CALCULATED.3IONS-SCNC: 11 MMOL/L (ref 7–16)
AST SERPL W P-5'-P-CCNC: 23 U/L (ref 11–45)
BASOPHILS # BLD AUTO: 0.05 K/UL (ref 0–0.2)
BASOPHILS NFR BLD AUTO: 0.7 % (ref 0–1)
BILIRUB SERPL-MCNC: 0.3 MG/DL
BUN SERPL-MCNC: 11 MG/DL (ref 10–20)
BUN/CREAT SERPL: 12 (ref 6–20)
CALCIUM SERPL-MCNC: 8.9 MG/DL (ref 8.4–10.2)
CHLORIDE SERPL-SCNC: 109 MMOL/L (ref 98–107)
CO2 SERPL-SCNC: 25 MMOL/L (ref 23–31)
CREAT SERPL-MCNC: 0.89 MG/DL (ref 0.55–1.02)
DIFFERENTIAL METHOD BLD: ABNORMAL
EGFR (NO RACE VARIABLE) (RUSH/TITUS): 69 ML/MIN/1.73M2
EOSINOPHIL # BLD AUTO: 0.73 K/UL (ref 0–0.5)
EOSINOPHIL NFR BLD AUTO: 10.7 % (ref 1–4)
ERYTHROCYTE [DISTWIDTH] IN BLOOD BY AUTOMATED COUNT: 12.7 % (ref 11.5–14.5)
GLOBULIN SER-MCNC: 3.3 G/DL (ref 2–4)
GLUCOSE SERPL-MCNC: 109 MG/DL (ref 82–115)
HCT VFR BLD AUTO: 41.8 % (ref 38–47)
HGB BLD-MCNC: 13.7 G/DL (ref 12–16)
IMM GRANULOCYTES # BLD AUTO: 0.02 K/UL (ref 0–0.04)
IMM GRANULOCYTES NFR BLD: 0.3 % (ref 0–0.4)
LYMPHOCYTES # BLD AUTO: 1.48 K/UL (ref 1–4.8)
LYMPHOCYTES NFR BLD AUTO: 21.8 % (ref 27–41)
MCH RBC QN AUTO: 30 PG (ref 27–31)
MCHC RBC AUTO-ENTMCNC: 32.8 G/DL (ref 32–36)
MCV RBC AUTO: 91.7 FL (ref 80–96)
MONOCYTES # BLD AUTO: 0.45 K/UL (ref 0–0.8)
MONOCYTES NFR BLD AUTO: 6.6 % (ref 2–6)
MPC BLD CALC-MCNC: 10.9 FL (ref 9.4–12.4)
NEUTROPHILS # BLD AUTO: 4.07 K/UL (ref 1.8–7.7)
NEUTROPHILS NFR BLD AUTO: 59.9 % (ref 53–65)
NRBC # BLD AUTO: 0 X10E3/UL
NRBC, AUTO (.00): 0 %
NT-PROBNP SERPL-MCNC: 22 PG/ML (ref 1–125)
PLATELET # BLD AUTO: 239 K/UL (ref 150–400)
POTASSIUM SERPL-SCNC: 3.8 MMOL/L (ref 3.5–5.1)
PROT SERPL-MCNC: 7 G/DL (ref 5.8–7.6)
RBC # BLD AUTO: 4.56 M/UL (ref 4.2–5.4)
SARS-COV-2 RDRP RESP QL NAA+PROBE: NEGATIVE
SODIUM SERPL-SCNC: 141 MMOL/L (ref 136–145)
TROPONIN I SERPL HS-MCNC: <2.7 NG/L
WBC # BLD AUTO: 6.8 K/UL (ref 4.5–11)

## 2025-06-13 PROCEDURE — 83880 ASSAY OF NATRIURETIC PEPTIDE: CPT | Performed by: NURSE PRACTITIONER

## 2025-06-13 PROCEDURE — 94640 AIRWAY INHALATION TREATMENT: CPT

## 2025-06-13 PROCEDURE — 36415 COLL VENOUS BLD VENIPUNCTURE: CPT | Performed by: NURSE PRACTITIONER

## 2025-06-13 PROCEDURE — 85025 COMPLETE CBC W/AUTO DIFF WBC: CPT | Performed by: NURSE PRACTITIONER

## 2025-06-13 PROCEDURE — 25000242 PHARM REV CODE 250 ALT 637 W/ HCPCS: Performed by: NURSE PRACTITIONER

## 2025-06-13 PROCEDURE — 80053 COMPREHEN METABOLIC PANEL: CPT | Performed by: NURSE PRACTITIONER

## 2025-06-13 PROCEDURE — 94761 N-INVAS EAR/PLS OXIMETRY MLT: CPT

## 2025-06-13 PROCEDURE — 99285 EMERGENCY DEPT VISIT HI MDM: CPT | Mod: 25

## 2025-06-13 PROCEDURE — 99900031 HC PATIENT EDUCATION (STAT)

## 2025-06-13 PROCEDURE — 87635 SARS-COV-2 COVID-19 AMP PRB: CPT | Performed by: NURSE PRACTITIONER

## 2025-06-13 PROCEDURE — 84484 ASSAY OF TROPONIN QUANT: CPT | Performed by: NURSE PRACTITIONER

## 2025-06-13 PROCEDURE — 93010 ELECTROCARDIOGRAM REPORT: CPT | Mod: ,,, | Performed by: HOSPITALIST

## 2025-06-13 PROCEDURE — 93005 ELECTROCARDIOGRAM TRACING: CPT

## 2025-06-13 RX ORDER — HYDROCODONE BITARTRATE AND HOMATROPINE METHYLBROMIDE ORAL SOLUTION 5; 1.5 MG/5ML; MG/5ML
5 LIQUID ORAL EVERY 4 HOURS PRN
Qty: 90 ML | Refills: 0 | Status: SHIPPED | OUTPATIENT
Start: 2025-06-13

## 2025-06-13 RX ORDER — IPRATROPIUM BROMIDE AND ALBUTEROL SULFATE 2.5; .5 MG/3ML; MG/3ML
3 SOLUTION RESPIRATORY (INHALATION) EVERY 6 HOURS PRN
Qty: 75 ML | Refills: 0 | Status: SHIPPED | OUTPATIENT
Start: 2025-06-13 | End: 2026-06-13

## 2025-06-13 RX ORDER — PREDNISONE 10 MG/1
10 TABLET ORAL DAILY
Qty: 21 TABLET | Refills: 0 | Status: SHIPPED | OUTPATIENT
Start: 2025-06-13

## 2025-06-13 RX ORDER — IPRATROPIUM BROMIDE AND ALBUTEROL SULFATE 2.5; .5 MG/3ML; MG/3ML
3 SOLUTION RESPIRATORY (INHALATION)
Status: COMPLETED | OUTPATIENT
Start: 2025-06-13 | End: 2025-06-13

## 2025-06-13 RX ORDER — BUDESONIDE 0.5 MG/2ML
0.5 INHALANT ORAL
Status: COMPLETED | OUTPATIENT
Start: 2025-06-13 | End: 2025-06-13

## 2025-06-13 RX ADMIN — IPRATROPIUM BROMIDE AND ALBUTEROL SULFATE 3 ML: 2.5; .5 SOLUTION RESPIRATORY (INHALATION) at 10:06

## 2025-06-13 RX ADMIN — BUDESONIDE INHALATION 0.5 MG: 0.5 SUSPENSION RESPIRATORY (INHALATION) at 10:06

## 2025-06-14 VITALS
HEART RATE: 79 BPM | DIASTOLIC BLOOD PRESSURE: 52 MMHG | WEIGHT: 154 LBS | BODY MASS INDEX: 25.66 KG/M2 | HEIGHT: 65 IN | SYSTOLIC BLOOD PRESSURE: 95 MMHG | OXYGEN SATURATION: 93 % | TEMPERATURE: 99 F | RESPIRATION RATE: 18 BRPM

## 2025-06-14 NOTE — ED PROVIDER NOTES
Encounter Date: 6/13/2025       History     Chief Complaint   Patient presents with    Shortness of Breath     Pt presents with c/o SOB since Sunday. Pt states she was around family that had bronchitis. Pt reports coughing until vomiting from coughing so hard. Sometimes coughing phlegm clear or white in color. Pt ambulatory to room but more SOB with exertion. Recent hx breast cx.      75-year-old female presents to ED with shortness of breathe and cough.  Patient reports that she was experiencing cough for approximately one-week with associated shortness of breathe.  She reports that cough is harsh and causes her to have post-tussive vomiting.  Patient states that she became more short of breath on today.  Denies chest pain, nausea/vomiting, fever, chills, diarrhea.  Patient states that she has developed right side and back pain and attributes it to coughing.  Patient reports sick contact with her granddaughter and states that her son has also been sick with bronchitis.    The history is provided by the patient and a relative.     Review of patient's allergies indicates:   Allergen Reactions    Nickel Hives    Iodine Hives    Iodine and iodide containing products     Latex, natural rubber      Past Medical History:   Diagnosis Date    Anxiety     Arthritis     Cancer     Hyperlipidemia     Hypothyroidism     Mitral valve prolapse     Stroke      Past Surgical History:   Procedure Laterality Date    BREAST MASS EXCISION Right 02/19/2025    Procedure: EXCISION, MASS, BREAST;  Surgeon: Gary Abdi MD;  Location: [facility] OR;  Service: General;  Laterality: Right;  excision biopsy right breast,possible mastectomy,sentinel node biopsy    BREAST SURGERY      CHOLECYSTECTOMY      SENTINEL LYMPH NODE BIOPSY Right 02/19/2025    Procedure: BIOPSY, LYMPH NODE, SENTINEL;  Surgeon: Gary Abdi MD;  Location: [facility] OR;  Service: General;  Laterality: Right;     Family History   Problem Relation Name Age of Onset     Alzheimer's disease Mother Rpw     Diabetes Mother Rpw     Stroke Mother Rpw     Meniere's disease Mother Rpw     Leukemia Father Fet     Cancer Father Fet     Parkinsonism Brother       Social History[1]  Review of Systems   Constitutional:  Positive for fatigue. Negative for chills and fever.   Eyes:  Negative for photophobia and visual disturbance.   Respiratory:  Positive for cough and shortness of breath.    Cardiovascular:  Negative for chest pain and palpitations.   Gastrointestinal:  Negative for nausea and vomiting.   Musculoskeletal:  Positive for back pain. Negative for arthralgias.   Skin:  Negative for color change and wound.   Neurological:  Negative for dizziness and headaches.   Hematological:  Negative for adenopathy. Does not bruise/bleed easily.   Psychiatric/Behavioral:  Negative for agitation and confusion.    All other systems reviewed and are negative.      Physical Exam     Initial Vitals [06/13/25 2211]   BP Pulse Resp Temp SpO2   135/75 97 (!) 21 98.5 °F (36.9 °C) (!) 93 %      MAP       --         Physical Exam    Nursing note and vitals reviewed.  Constitutional: She appears well-developed and well-nourished.   HENT:   Head: Normocephalic and atraumatic.   Eyes: EOM are normal. Pupils are equal, round, and reactive to light.   Neck: Neck supple.   Normal range of motion.  Cardiovascular:  Normal rate and regular rhythm.           No murmur heard.  Pulmonary/Chest: She has decreased breath sounds. She has no wheezes. She has no rhonchi.   Abdominal: Abdomen is soft. She exhibits no distension. There is no abdominal tenderness.   Musculoskeletal:         General: No tenderness or edema.      Cervical back: Normal range of motion and neck supple.     Lymphadenopathy:     She has no cervical adenopathy.   Neurological: She is alert and oriented to person, place, and time. No cranial nerve deficit or sensory deficit.   Skin: Skin is warm and dry.   Psychiatric: She has a normal mood and  affect. Thought content normal.         Medical Screening Exam   See Full Note    ED Course   Procedures  Labs Reviewed   COMPREHENSIVE METABOLIC PANEL - Abnormal       Result Value    Sodium 141      Potassium 3.8      Chloride 109 (*)     CO2 25      Anion Gap 11      Glucose 109      BUN 11      Creatinine 0.89      BUN/Creatinine Ratio 12      Calcium 8.9      Total Protein 7.0      Albumin 3.7      Globulin 3.3      A/G Ratio 1.1      Bilirubin, Total 0.3      Alk Phos 71      ALT 15      AST 23      eGFR 69     CBC WITH DIFFERENTIAL - Abnormal    WBC 6.80      RBC 4.56      Hemoglobin 13.7      Hematocrit 41.8      MCV 91.7      MCH 30.0      MCHC 32.8      RDW 12.7      Platelet Count 239      MPV 10.9      Neutrophils % 59.9      Lymphocytes % 21.8 (*)     Monocytes % 6.6 (*)     Eosinophils % 10.7 (*)     Basophils % 0.7      Immature Granulocytes % 0.3      nRBC, Auto 0.0      Neutrophils, Abs 4.07      Lymphocytes, Absolute 1.48      Monocytes, Absolute 0.45      Eosinophils, Absolute 0.73 (*)     Basophils, Absolute 0.05      Immature Granulocytes, Absolute 0.02      nRBC, Absolute 0.00      Diff Type Auto     SARS-COV-2 RNA AMPLIFICATION, QUAL - Normal    SARS COV-2 Molecular Negative      Narrative:     Negative SARS-CoV results should not be used as the sole basis for treatment or patient management decisions; negative results should be considered in the context of a patient's recent exposures, history and the presene of clinical signs and symptoms consistent with COVID-19.  Negative results should be treated as presumptive and confirmed by molecular assay, if necessary for patient management.   TROPONIN I - Normal    Troponin I High Sensitivity <2.7     NT-PRO NATRIURETIC PEPTIDE - Normal    ProBNP 22     CBC W/ AUTO DIFFERENTIAL    Narrative:     The following orders were created for panel order CBC Auto Differential.  Procedure                               Abnormality         Status                      ---------                               -----------         ------                     CBC with Differential[[id number]]       Abnormal            Final result                 Please view results for these tests on the individual orders.          Imaging Results              X-Ray Chest 1 View (In process)                      Medications   albuterol-ipratropium 2.5 mg-0.5 mg/3 mL nebulizer solution 3 mL (3 mLs Nebulization Given 6/13/25 2246)   budesonide nebulizer solution 0.5 mg (0.5 mg Nebulization Given 6/13/25 2250)     Medical Decision Making  75-year-old female presents to ED with shortness of breathe and cough.  Patient reports that she was experiencing cough for approximately one-week with associated shortness of breathe.  She reports that cough is harsh and causes her to have post-tussive vomiting.  Patient states that she became more short of breath on today.  Denies chest pain, nausea/vomiting, fever, chills, diarrhea.  Patient states that she has developed right side and back pain and attributes it to coughing.  Patient reports sick contact with her granddaughter and states that her son has also been sick with bronchitis.    Labs, diagnostics ordered and reviewed     EKG ordered and reviewed   EKG significant for no ST-elevation   Rate 92  Rhythm sinus arrhythmia  Interpreted by ED physician    simran Hernandez administered  Prescriptions provided    Amount and/or Complexity of Data Reviewed  Labs: ordered.  Radiology: ordered.    Risk  Prescription drug management.                                      Clinical Impression:   Final diagnoses:  [R06.02] Shortness of breath  [J40] Bronchitis (Primary)        ED Disposition Condition    Discharge Stable          ED Prescriptions       Medication Sig Dispense Start Date End Date Auth. Provider    albuterol-ipratropium (DUO-NEB) 2.5 mg-0.5 mg/3 mL nebulizer solution Take 3 mLs by nebulization every 6 (six) hours as needed for Wheezing. Rescue 75 mL  6/13/2025 6/13/2026 Tabitha Josue FNP    hydrocodone-homatropine 5-1.5 mg/5 ml (HYCODAN) 5-1.5 mg/5 mL Soln Take 5 mLs by mouth every 4 (four) hours as needed (cough). 90 mL 6/13/2025 -- Tabitha Josue FNP    predniSONE (DELTASONE) 10 MG tablet Take 1 tablet (10 mg total) by mouth once daily. Take 4 tabs x 3 days, then take 2 tabs x 3 days, then take 1 tab x 3 days. 21 tablet 6/13/2025 -- Tabitha Josue FNP          Follow-up Information    None              [1]   Social History  Tobacco Use    Smoking status: Every Day     Current packs/day: 1.00     Average packs/day: 1 pack/day for 15.0 years (15.0 ttl pk-yrs)     Types: Cigarettes     Passive exposure: Current    Smokeless tobacco: Never   Substance Use Topics    Alcohol use: Never    Drug use: Never        Tabitha Josue FNP  06/14/25 0006

## 2025-06-15 LAB
OHS QRS DURATION: 80 MS
OHS QRS DURATION: 82 MS
OHS QTC CALCULATION: 404 MS
OHS QTC CALCULATION: 416 MS

## (undated) DEVICE — GOWN POLY REINF BRTH SLV XL

## (undated) DEVICE — GLOVE SENSICARE PI GRN 6.5

## (undated) DEVICE — RESERVOIR JACKSON-PRATT 100CC

## (undated) DEVICE — SUT VICRYL PLUS 4-0 FS-2 27IN

## (undated) DEVICE — SUT SILK 2.0 BLK 18

## (undated) DEVICE — SUT CTD VICRYL 3-0 CR/SH

## (undated) DEVICE — TUBE SUCTION MEDI-VAC STERILE

## (undated) DEVICE — DRAIN FLAT HUBLESS FULL 10MM

## (undated) DEVICE — DRESSING TRANS 4X4 TEGADERM

## (undated) DEVICE — STRIP MEDI WND CLSR 1/4X4IN

## (undated) DEVICE — GLOVE SENSICARE PI SURG 8

## (undated) DEVICE — SOL IRRI STRL WATER 1000ML

## (undated) DEVICE — KIT BASIC RUSH

## (undated) DEVICE — PROBE TRUNODE GAMMA HAND PIECE

## (undated) DEVICE — GOWN NONREINF SET-IN SLV 2XL

## (undated) DEVICE — GLOVE SENSICARE PI SURG 6

## (undated) DEVICE — TIP YANKAUERS BULB NO VENT

## (undated) DEVICE — APPLIER LIGACLIP MED 11IN